# Patient Record
Sex: MALE | Race: ASIAN | NOT HISPANIC OR LATINO | Employment: FULL TIME | URBAN - METROPOLITAN AREA
[De-identification: names, ages, dates, MRNs, and addresses within clinical notes are randomized per-mention and may not be internally consistent; named-entity substitution may affect disease eponyms.]

---

## 2017-01-23 ENCOUNTER — TELEPHONE (OUTPATIENT)
Dept: FAMILY MEDICINE | Facility: CLINIC | Age: 32
End: 2017-01-23

## 2017-01-23 NOTE — TELEPHONE ENCOUNTER
[facility] Follow-up    Call initiated by clinic.  Message recorded by Fabiana Mendez  Calling for: monthly follow up  Patient: See HERBER French  Phone conversation with: Patient  Patient's Phone number/s: Home number on file [phone] (home)  Available today in the AM on their Home number.  OK to leave message on voice mail? Yes      Major diagnoses and/or issues requiring coordination services  Vertigo     In the past month, how many times have you been to the Emergency Room? 0  In the past month, how many times have you been hospitalized? 0    Summary notes: I called to check up on the pt, pt stated that he is doing the same. Pt has been taking his medication as directed.  Pt stated that his vertigo still the same, [facility].  Pt stated that he is just waiting, but for now he is doing fine. Pt did not have any other issue or concerns.      Self-management goals:  Get healthy    Counseling and coordination activities with: ROWAN Giles    Follow-up date: monthly

## 2017-01-25 ENCOUNTER — TRANSFERRED RECORDS (OUTPATIENT)
Dept: HEALTH INFORMATION MANAGEMENT | Facility: CLINIC | Age: 32
End: 2017-01-25

## 2017-02-07 ENCOUNTER — TELEPHONE (OUTPATIENT)
Dept: FAMILY MEDICINE | Facility: CLINIC | Age: 32
End: 2017-02-07

## 2017-02-07 NOTE — TELEPHONE ENCOUNTER
[facility] Follow-up    Call initiated by clinic.  Message recorded by Fabiana Mendez  Calling for: monthly follow up   Patient: See HERBER French  Phone conversation with: Patient  Patient's Phone number/s: Home number on file [phone] (home)  Available today in the PM on their Home number.  OK to leave message on voice mail? Yes      Major diagnoses and/or issues requiring coordination services  Dizzy     In the past month, how many times have you been to the Emergency Room? 0  In the past month, how many times have you been hospitalized? 0    Summary notes: I called to check up on the pt today, pt stated that he is doing the same.  Pt stated that he has been taking his medication as directed.  Pt stated that he still feels dizzy, he still goes to the [facility] center.  Pt is ok for now, he does not have any other issue or concerns right.     Self-management goals:  Walk more    Counseling and coordination activities with: ROWAN Giles    Follow-up date: monthly

## 2017-02-13 ENCOUNTER — TRANSFERRED RECORDS (OUTPATIENT)
Dept: HEALTH INFORMATION MANAGEMENT | Facility: CLINIC | Age: 32
End: 2017-02-13

## 2017-02-16 ENCOUNTER — OFFICE VISIT (OUTPATIENT)
Dept: FAMILY MEDICINE | Facility: CLINIC | Age: 32
End: 2017-02-16

## 2017-02-16 VITALS
TEMPERATURE: 97.8 F | DIASTOLIC BLOOD PRESSURE: 78 MMHG | HEIGHT: 64 IN | SYSTOLIC BLOOD PRESSURE: 112 MMHG | WEIGHT: 214 LBS | BODY MASS INDEX: 36.54 KG/M2 | RESPIRATION RATE: 16 BRPM | OXYGEN SATURATION: 97 % | HEART RATE: 69 BPM

## 2017-02-16 DIAGNOSIS — N62 GYNECOMASTIA, MALE: ICD-10-CM

## 2017-02-16 DIAGNOSIS — F33.3 SEVERE EPISODE OF RECURRENT MAJOR DEPRESSIVE DISORDER, WITH PSYCHOTIC FEATURES (H): Primary | ICD-10-CM

## 2017-02-16 DIAGNOSIS — H71.92 CHOLESTEATOMA, LEFT: ICD-10-CM

## 2017-02-16 DIAGNOSIS — R42 VERTIGO: ICD-10-CM

## 2017-02-16 NOTE — PROGRESS NOTES
Preceptor Attestation:  Patient's case reviewed and discussed with Shahbaz Garcia MD Patient seen and discussed with the resident.. I agree with assessment and plan of care.  Supervising Physician:  Lucy Malik DO  [facility]

## 2017-02-16 NOTE — PROGRESS NOTES
[facility]        Subjective   HPI: See HERBER French is a 31 year old  male with history of hep B, chronic dizziness s/p cholesteatoma surgery, major depression who presents as an established patient (with his son who is here for a C) for the following:    Ears: nightly foul smelling drainage started about 2-3 weeks after his last cleaning by ENT on 12/15/16. Very frustrated this is still happening.     Dizziness: Has been going to the [facility] center again. Last dizzy note advised PT due to deconditioning.    Hep B: went to [facility] in December. Compliant with tenofovir.    Mood: Now seeing a counselor every Friday at [facility], which he has found helpful.  Meds are managed by [facility] in [facility].  He has continued taking Abilify, sertraline and Minipress.   He denies any suicidal ideation currently.  Feels mood is about the same.    Financial: They have some school loans that are due and haven't been able to pay for - this is stressful and weighing on his mood. He is wondering what to do.    Dental: He is wondering if we could help set him up with a dentist - wasn't able to get this set up previously.    Gynecomastia: new complaint. For past 3 years. It's not much worse but certainly not getting any better.    ROS: constitutional, cardiovascular, respiratory, GI, , MSK systems reviewed and negative except as noted above.    PMH:   Current Outpatient Prescriptions   Medication     traZODone (DESYREL) 50 MG tablet     ARIPiprazole (ABILIFY) 10 MG tablet     tenofovir (VIREAD) 300 MG tablet     ibuprofen (ADVIL,MOTRIN) 600 MG tablet     prazosin (MINIPRESS) 1 MG capsule     sertraline (ZOLOFT) 100 MG tablet     meclizine (ANTIVERT) 25 MG tablet     order for DME     No current facility-administered medications for this visit.       ALLERGIES: Influenza vac split [flu virus vaccine]  Social: Denies tobacco, alcohol and illicit drug use.           Objective   /78 (BP Location: Right arm,  "Patient Position: Chair, Cuff Size: Adult Regular)  Pulse 69  Temp 97.8  F (36.6  C) (Oral)  Resp 16  Ht 5' 3.5\" (161.3 cm)  Wt 214 lb (97.1 kg)  SpO2 97%  BMI 37.31 kg/m2 Body mass index is 37.31 kg/(m^2).  Gen: healthy, alert and no distress,  HEENT: R ear normal. L ear with minimal yellow tinged drainage s/p cholesteatoma surgery.  Pulm: lungs clear to auscultation - no rales, rhonchi or wheezes  CV: regular rate and rhythm,  and no murmur, click,  rub or gallop  Abd: soft, nontender  Neuro: Normal strength and tone, sensory exam grossly normal, mentation appears intact and speech normal  Psych: flat affect, linear logical coherent thought process         Assessment & Plan     1. Severe episode of recurrent major depressive disorder, with psychotic features (H)  Continue abilify, sertraline and Minipress. Some auditory hallucinations, stable. No SI/HI. Compliant with therapy & [facility] f/u.    2. Cholesteatoma, left  Will have him return to see Dr Shields for repeat cleaning. Wonder if q3m cleanings are not frequent enough.    3. Vertigo  Continue at [facility].    4. Gynecomastia, male  Bilateral. Most likely secondary to atypical antipsychotic use. Will have him discuss this with his psychiatrist first.     F/u 2 months    Precepted today with: Dr King Garcia MD          "

## 2017-02-16 NOTE — MR AVS SNAPSHOT
After Visit Summary   2/16/2017    See HERBER French    MRN: [id number]           Patient Information     Date Of Birth          1985        Visit Information        Provider Department      2/16/2017 3:20 PM Shahbaz Garcia MD [facility]         Follow-ups after your visit        Your next 10 appointments already scheduled     Mar 16, 2017 11:00 AM CDT   Return Visit with Shahbaz Garcia MD   [facility] ([facility])    [address] [place] [id number]   [phone]              Who to contact     Please call your clinic at [phone] to:    Ask questions about your health    Make or cancel appointments    Discuss your medicines    Learn about your test results    Speak to your doctor   If you have compliments or concerns about an experience at your clinic, or if you wish to file a complaint, please contact [facility] Patient Relations at [phone] or email us at [email or web address]         Additional Information About Your Visit        MyChart Information     Zeetlt gives you secure access to your electronic health record. If you see a primary care provider, you can also send messages to your care team and make appointments. If you have questions, please call your primary care clinic.  If you do not have a primary care provider, please call [phone] and they will assist you.      Prezma is an electronic gateway that provides easy, online access to your medical records. With Prezma, you can request a clinic appointment, read your test results, renew a prescription or communicate with your care team.     To access your existing account, please contact your [facility] or call [phone] for assistance.        Care EveryWhere ID     This is your Care EveryWhere ID. This could be used by other organizations to access your [facility] medical records  [id number]        Your Vitals  "Were     Pulse Temperature Respirations Height Pulse Oximetry BMI (Body Mass Index)    69 97.8  F (36.6  C) (Oral) 16 5' 3.5\" (161.3 cm) 97% 37.31 kg/m2       Blood Pressure from Last 3 Encounters:   02/16/17 112/78   12/12/16 101/68   10/25/16 105/72    Weight from Last 3 Encounters:   02/16/17 214 lb (97.1 kg)   12/12/16 207 lb (93.9 kg)   10/25/16 210 lb 9.6 oz (95.5 kg)              Today, you had the following     No orders found for display       Primary Care Provider Office Phone # Fax #    Shahbaz Garcia [phone]       [facility] [address] [place] [address]        Thank you!     Thank you for choosing [facility]  for your care. Our goal is always to provide you with excellent care. Hearing back from our patients is one way we can continue to improve our services. Please take a few minutes to complete the written survey that you may receive in the mail after your visit with us. Thank you!             Your Updated Medication List - Protect others around you: Learn how to safely use, store and throw away your medicines at www.disposemymeds.org.          This list is accurate as of: 2/16/17  4:26 PM.  Always use your most recent med list.                   Brand Name Dispense Instructions for use    ARIPiprazole 10 MG tablet    ABILIFY     Take 1 tablet (10 mg) by mouth At Bedtime       ibuprofen 600 MG tablet    ADVIL/MOTRIN    120 tablet    Take 1 tablet (600 mg) by mouth every 6 hours as needed       meclizine 25 MG tablet    ANTIVERT    30 tablet    Take 1 tablet (25 mg) by mouth every 6 hours as needed for dizziness       order for DME     1 Device    Equipment being ordered: wheeled walker       prazosin 1 MG capsule    MINIPRESS    30 capsule    Take 2 capsules (2 mg) by mouth At Bedtime       sertraline 100 MG tablet    ZOLOFT    60 tablet    Take 2 tablets (200 mg) by mouth daily       traZODone 50 MG tablet    DESYREL    30 tablet    Take 1 " tablet (50 mg) by mouth At Bedtime       VIREAD 300 MG tablet   Generic drug:  tenofovir      Take 1 tablet (300 mg) by mouth daily

## 2017-03-01 ENCOUNTER — TELEPHONE (OUTPATIENT)
Dept: FAMILY MEDICINE | Facility: CLINIC | Age: 32
End: 2017-03-01

## 2017-03-01 NOTE — TELEPHONE ENCOUNTER
[facility] Follow-up    Call initiated by clinic.  Message recorded by Fabiana Mendez  Calling for: monthly follow up   Patient: See HERBER French  Phone conversation with: Patient  Patient's Phone number/s: Home number on file [phone] (home)  Available today in the PM on their Home number.  OK to leave message on voice mail? Yes      Major diagnoses and/or issues requiring coordination services  Vertigo      In the past month, how many times have you been to the Emergency Room? 0  In the past month, how many times have you been hospitalized? 0    Summary notes: I called to check up on the pt, pt did not answer his phone.  I left a vm for the pt to give me a call back.     Self-management goals:      Counseling and coordination activities with: ROWAN Giles  Follow-up date: monthly

## 2017-03-02 ENCOUNTER — TELEPHONE (OUTPATIENT)
Dept: FAMILY MEDICINE | Facility: CLINIC | Age: 32
End: 2017-03-02

## 2017-03-02 NOTE — TELEPHONE ENCOUNTER
I got a message from  about the pt, he wanted me to call the pt.   wanted me to call the pt, and have him discuss his breast enlargement with his psychiatrist.   stated that it could be the medication that he is taking.   also wanted me to help the pt with setting up a ENT appointment.  It seems like the has been having more drainage from his ear lately.  I called the pt to inform him about the breast enlargement, but the pt did not answer his phone.  I have left the pt a vm to talk to his psychiatrist about it, and I will help him set up the ENT appointment.      I called [facility] to help set up an appointment with .  I was able to set up the pt for 03/09/17 at 9:30am.      What: ENT  Where: [facility]    When: 03/09/17 at 9:30am   Who is with:   Telephone: [phone]  Fax:   Any additional comments:   Scheduled by: ROWAN Giles

## 2017-03-09 ENCOUNTER — TRANSFERRED RECORDS (OUTPATIENT)
Dept: HEALTH INFORMATION MANAGEMENT | Facility: CLINIC | Age: 32
End: 2017-03-09

## 2017-03-16 ENCOUNTER — OFFICE VISIT (OUTPATIENT)
Dept: FAMILY MEDICINE | Facility: CLINIC | Age: 32
End: 2017-03-16

## 2017-03-16 VITALS
SYSTOLIC BLOOD PRESSURE: 116 MMHG | RESPIRATION RATE: 20 BRPM | DIASTOLIC BLOOD PRESSURE: 79 MMHG | HEART RATE: 85 BPM | BODY MASS INDEX: 37.87 KG/M2 | WEIGHT: 217.2 LBS | TEMPERATURE: 97.8 F | OXYGEN SATURATION: 96 %

## 2017-03-16 DIAGNOSIS — R42 VERTIGO: ICD-10-CM

## 2017-03-16 DIAGNOSIS — N62 GYNECOMASTIA, MALE: ICD-10-CM

## 2017-03-16 DIAGNOSIS — L03.011 PARONYCHIA OF FINGER, RIGHT: ICD-10-CM

## 2017-03-16 DIAGNOSIS — B18.1 CHRONIC HEPATITIS B VIRUS INFECTION (H): Primary | Chronic | ICD-10-CM

## 2017-03-16 NOTE — MR AVS SNAPSHOT
After Visit Summary   3/16/2017    See HERBER French    MRN: [id number]           Patient Information     Date Of Birth          1985        Visit Information        Provider Department      3/16/2017 11:00 AM Shahbaz Garcia MD [facility]        Today's Diagnoses     Chronic hepatitis B virus infection (H)    -  1    Vertigo        Gynecomastia, male        Paronychia of finger, right           Follow-ups after your visit        Your next 10 appointments already scheduled     Apr 24, 2017  2:20 PM CDT   Return Visit with Shahbaz Garcia MD   [facility] ([facility])    [address] [place] [address]   [phone]              Who to contact     Please call your clinic at [phone] to:    Ask questions about your health    Make or cancel appointments    Discuss your medicines    Learn about your test results    Speak to your doctor   If you have compliments or concerns about an experience at your clinic, or if you wish to file a complaint, please contact [facility] Patient Relations at [phone] or email us at [email or web address]         Additional Information About Your Visit        MyChart Information     Jump On Itt gives you secure access to your electronic health record. If you see a primary care provider, you can also send messages to your care team and make appointments. If you have questions, please call your primary care clinic.  If you do not have a primary care provider, please call [phone] and they will assist you.      J[employer] On Itt is an electronic gateway that provides easy, online access to your medical records. With [employer], you can request a clinic appointment, read your test results, renew a prescription or communicate with your care team.     To access your existing account, please contact your [facility] or call [phone] for assistance.        Care EveryWhere ID      This is your Care EveryWhere ID. This could be used by other organizations to access your [facility] medical records  [id number]        Your Vitals Were     Pulse Temperature Respirations Pulse Oximetry BMI (Body Mass Index)       85 97.8  F (36.6  C) 20 96% 37.87 kg/m2        Blood Pressure from Last 3 Encounters:   03/16/17 116/79   02/16/17 112/78   12/12/16 101/68    Weight from Last 3 Encounters:   03/16/17 217 lb 3.2 oz (98.5 kg)   02/16/17 214 lb (97.1 kg)   12/12/16 207 lb (93.9 kg)              Today, you had the following     No orders found for display       Primary Care Provider Office Phone # Fax #    Shahbaz Garcia [phone]       [facility] [address] [place] [address]        Thank you!     Thank you for choosing [facility]  for your care. Our goal is always to provide you with excellent care. Hearing back from our patients is one way we can continue to improve our services. Please take a few minutes to complete the written survey that you may receive in the mail after your visit with us. Thank you!             Your Updated Medication List - Protect others around you: Learn how to safely use, store and throw away your medicines at www.disposemymeds.org.          This list is accurate as of: 3/16/17 11:59 PM.  Always use your most recent med list.                   Brand Name Dispense Instructions for use    ARIPiprazole 10 MG tablet    ABILIFY     Take 1 tablet (10 mg) by mouth At Bedtime       ibuprofen 600 MG tablet    ADVIL/MOTRIN    120 tablet    Take 1 tablet (600 mg) by mouth every 6 hours as needed       meclizine 25 MG tablet    ANTIVERT    30 tablet    Take 1 tablet (25 mg) by mouth every 6 hours as needed for dizziness       order for DME     1 Device    Equipment being ordered: wheeled walker       prazosin 1 MG capsule    MINIPRESS    30 capsule    Take 2 capsules (2 mg) by mouth At Bedtime       sertraline 100 MG tablet     ZOLOFT    60 tablet    Take 2 tablets (200 mg) by mouth daily       traZODone 50 MG tablet    DESYREL    30 tablet    Take 1 tablet (50 mg) by mouth At Bedtime       VIREAD 300 MG tablet   Generic drug:  tenofovir      Take 1 tablet (300 mg) by mouth daily

## 2017-03-16 NOTE — PROGRESS NOTES
[facility]        Subjective   HPI: See HERBER French is a 31 year old  male with history of hep B, chronic dizziness s/p cholesteatoma surgery, major depression who presents as an established patient for the following:    Right ring finger - has a small area of redness on it and it quite a bit.    Ears: went back to Dr Shields, cleaned out left ear. It feels very scratchy.  Dr King put in some powder but the ear is still draining. Advised to return in three months.    Dizziness: Needs souk to reschedule at dizzy center and with PT as recommended by the dizzy center.  He is still intermittently lightheaded & dizzy, having problems walking straight.     Hep B: went to [facility] in December but we never received a copy of the records. He says they checked his blood but isn't sure what the results were..     Mood: Now seeing a counselor every Friday at [facility], which he has found helpful.  Meds are managed by [facility] in [facility].  He has continued taking Abilify, sertraline and Minipress.   He denies any suicidal ideation currently.  Feels mood is about the same. Says he was recently established with a occupational counselor of some sort to see if he would be eligible to work on some capacity again.     Dental: went to see dentist, wanted to do a cleaning that would cost $500 to fix several cavities, insurance wouldn't cover it.     Gynecomastia: he reports he discussed this with his psychiatrist and they might check blood work. His breasts are slightly tender bilaterally. There is not any discharge. His testicles are normal in size (per See) and he is able to have erections without difficulty.     ROS: constitutional, cardiovascular, respiratory, GI, , MSK systems reviewed and negative except as noted above.    PMH:   Current Outpatient Prescriptions   Medication     traZODone (DESYREL) 50 MG tablet     ARIPiprazole (ABILIFY) 10 MG tablet     tenofovir (VIREAD) 300 MG tablet      ibuprofen (ADVIL,MOTRIN) 600 MG tablet     prazosin (MINIPRESS) 1 MG capsule     sertraline (ZOLOFT) 100 MG tablet     meclizine (ANTIVERT) 25 MG tablet     order for DME     No current facility-administered medications for this visit.       ALLERGIES: Influenza vac split [flu virus vaccine]  Social: Denies tobacco, alcohol and illicit drug use.  Still having some financial stress from old student loans.           Objective   /79  Pulse 85  Temp 97.8  F (36.6  C)  Resp 20  Wt 217 lb 3.2 oz (98.5 kg)  SpO2 96%  BMI 37.87 kg/m2 Body mass index is 37.87 kg/(m^2).  Gen: healthy, alert and no distress,  HEENT: R ear normal. L ear with minimal yellow tinged drainage s/p cholesteatoma surgery.  Pulm: lungs clear to auscultation - no rales, rhonchi or wheezes  CV: regular rate and rhythm,  and no murmur, click,  rub or gallop  Abd: soft, nontender  Extrem: right ring finger with a trace amount ( <2mm x 2mm) of erythema on the ulnar aspect of the nail.   Psych: flat affect, linear logical coherent thought process         Assessment & Plan   1. Chronic hepatitis B virus infection (H)  Will request records from [facility]    2. Vertigo  Asked souk to set him up for PT & dizzy center appointments.    3. Gynecomastia, male  Sounds like his psychiatrist will check blood work and then decide if to he should stay on his current regimen or not. At this time do not think there is any red flags to suggest this is anything except for medication adverse effect from his atypical antipsychotic.    4. Paronychia of finger, right  Very mild. Advised warm compresses / soaks and to return if not improving.     F/u 2 months    Precepted today with: Dr King Garcia MD

## 2017-03-17 ASSESSMENT — PATIENT HEALTH QUESTIONNAIRE - PHQ9: SUM OF ALL RESPONSES TO PHQ QUESTIONS 1-9: 12

## 2017-03-21 ENCOUNTER — TRANSFERRED RECORDS (OUTPATIENT)
Dept: HEALTH INFORMATION MANAGEMENT | Facility: CLINIC | Age: 32
End: 2017-03-21

## 2017-04-04 ENCOUNTER — TELEPHONE (OUTPATIENT)
Dept: FAMILY MEDICINE | Facility: CLINIC | Age: 32
End: 2017-04-04

## 2017-04-04 NOTE — TELEPHONE ENCOUNTER
[facility] Follow-up    Call initiated by clinic.  Message recorded by Fabiana Mendez  Calling for: monthly follow up   Patient: See HERBER French  Phone conversation with: Patient  Patient's Phone number/s: Home number on file [phone] (home)  Available today in the AM on their Home number.  OK to leave message on voice mail? Yes      Major diagnoses and/or issues requiring coordination services  Vertigo      In the past month, how many times have you been to the Emergency Room? 0  In the past month, how many times have you been hospitalized? 0    Summary notes: I called to check up on the pt, pt stated that he is doing the same.  Pt stated that he has been taking his medication as directed.  Pt stated that he is still [facility] with [facility].  Pt stated that it has been ok lately.  Pt stated that he seems to be doing fine for now.  Pt does not have any other issue or concerns right now.      Self-management goals:  Get out more    Counseling and coordination activities with: ROWAN Giles    Follow-up date: monthly

## 2017-04-24 ENCOUNTER — OFFICE VISIT (OUTPATIENT)
Dept: FAMILY MEDICINE | Facility: CLINIC | Age: 32
End: 2017-04-24

## 2017-04-24 VITALS
DIASTOLIC BLOOD PRESSURE: 71 MMHG | HEART RATE: 68 BPM | RESPIRATION RATE: 20 BRPM | SYSTOLIC BLOOD PRESSURE: 107 MMHG | WEIGHT: 220.8 LBS | HEIGHT: 64 IN | OXYGEN SATURATION: 96 % | BODY MASS INDEX: 37.69 KG/M2 | TEMPERATURE: 97.6 F

## 2017-04-24 DIAGNOSIS — H71.92 CHOLESTEATOMA, LEFT: Primary | ICD-10-CM

## 2017-04-24 DIAGNOSIS — B18.1 CHRONIC HEPATITIS B VIRUS INFECTION (H): Chronic | ICD-10-CM

## 2017-04-24 DIAGNOSIS — F33.3 SEVERE EPISODE OF RECURRENT MAJOR DEPRESSIVE DISORDER, WITH PSYCHOTIC FEATURES (H): ICD-10-CM

## 2017-04-24 NOTE — PROGRESS NOTES
"[facility]        Subjective   HPI: See HERBER French is a 31 year old male with history of hep B, depression who presents as an established patient for the followin.  Left shoulder nodule: Patient reports a bump on his left upper arm that has been present for about the past 2 years.  He says it has been slowly growing in size and is quite painful to touch, especially when his children give him a hug or is playing with them. He denies any fevers or chills.  Denies any history of foreign bodies in this area.     2.  Patient fell out of chair a few months ago at his house. He has a picture of the broken chair with the rear legs collapsed and he fell backwards.   He reports a small amount of back pain on a few days a week.  He has been using ibuprofen and massage to help with this.    3.  With regards to hepatitis B, we recently received a letter from [facility] saying that they were concerned he was not taking his telaprevir every single day.  Patient and his wife both confirm that he has actually been taking this.  On call the pharmacy, he has refilled his prescription every single month since September at around the same time each month.    4.  He continues to have chronic ear drainage from his left ear.  He reports that his wife thinks it is foul-smelling.  He recently went to Dr. Shields who again cleaned that out.    5. With regard to his gynecomastia, the patient reports that he did talk about it with his psychiatrist but he cannot remember what the conclusion was.        ROS: constitutional, cardiovascular, respiratory, GI, , MSK systems reviewed and negative except as noted above.    Social:  Lives with wife and children. Unemployed.          Objective   /71 (BP Location: Right arm, Patient Position: Chair, Cuff Size: Adult Regular)  Pulse 68  Temp 97.6  F (36.4  C) (Oral)  Resp 20  Ht 5' 3.5\" (161.3 cm)  Wt 220 lb 12.8 oz (100.2 kg)  SpO2 96%  BMI 38.5 kg/m2 " Body mass index is 38.5 kg/(m^2).  Gen: healthy, alert and no distress,  HEENT: no adenopathy, no asymmetry, masses, or scars  Pulm: lungs clear to auscultation - no rales, rhonchi or wheezes  CV: regular rate and rhythm,  and no murmur, click,  rub or gallop  Chest: significant gynecomastia bilaterally  Abd: soft, nontender, obese  Back: nontender to palpation  Neuro: Normal strength and tone, sensory exam grossly normal, mentation appears intact and speech normal  Psych: mood and affect flat    Bedside ultrasound of the left upper arm reveals a 6 mm fluid-filled structure with clearly delineated borders just beneath the skin surface.         Assessment & Plan     1.  Arm nodule: Suspect this may be an inclusion cyst although it is quite firm on exam.  He will return for a procedure only visit to have this removed as it continues to be quite painful and has not improved with simply waiting.    2. hepatitis B:  he appears to be very compliant with his telaprevir.  I will call [facility] and inform him of the pharmacy records confirming his compliance;   They may need to adjust his medications.    3.  Cholesteatoma: Patient has been compliant with frequent cleanings with Dr. Shields but has not improved.  It seems reasonable at this point to request a second opinion to see if his management of this can be optimized in any way.  We will see if we get him set up with [facility].    4.  Gynecomastia I will call his psychiatrist to see what her plan was with this as it certainly seems to have gotten worse from the time I first met Mr. French.    5.  Back pain after falling out of the chair: Suspect localized bruising. We did not have time to address this further today.  Will review at next visit if it is not improved at that point.    Precepted today with: MD Shahbaz Wooten MD    -------  PMH:  Patient Active Problem List   Diagnosis     Chronic hepatitis B virus infection (H)     LTBI (latent tuberculosis  infection)     Severe episode of recurrent major depressive disorder, with psychotic features (H)     Atypical chest pain     [facility]     H/O suicide attempt     Nightmare     Back pain     Vertigo     Severe vertigo      Current Outpatient Prescriptions   Medication     traZODone (DESYREL) 50 MG tablet     ARIPiprazole (ABILIFY) 10 MG tablet     tenofovir (VIREAD) 300 MG tablet     ibuprofen (ADVIL,MOTRIN) 600 MG tablet     prazosin (MINIPRESS) 1 MG capsule     sertraline (ZOLOFT) 100 MG tablet     meclizine (ANTIVERT) 25 MG tablet     order for DME     No current facility-administered medications for this visit.       ALLERGIES: Influenza vac split [flu virus vaccine]    Preceptor Attestation:  I saw and evaluated the patient.  I reviewed the resident physician's history, exam, and treatment plan; and I agree with the documentation by the resident physician.  Supervising Physician:  Ricardo Hardy MD

## 2017-04-24 NOTE — MR AVS SNAPSHOT
After Visit Summary   4/24/2017    See HERBER French    MRN: [id number]           Patient Information     Date Of Birth          1985        Visit Information        Provider Department      4/24/2017 2:20 PM Shahbaz Garcia MD [facility]        Today's Diagnoses     Cholesteatoma, left    -  1    Severe episode of recurrent major depressive disorder, with psychotic features (H)        Chronic hepatitis B virus infection (H)          Care Instructions    Referral for ( TEST )  :      Otolaryngology  LOCATION/PLACE/Provider :    [facility] [address] [place] [address]  DATE & TIME :     4-25-17 at 9:30am  PHONE :     [phone]  FAX :     [phone]  ADDITIONAL INFORMATION :     NA  Appointment made by clinic staff/:    STEFANO      4/26/17 [facility] consult notes to Dr. Garcia. JOSÉ MIGUEL Gar (c)        Follow-ups after your visit        Additional Services     OTOLARYNGOLOGY REFERRAL       Patient to stop at the [facility] Desk    Reason for Referral: [facility]. cholesteatoma, 2nd opinion. Chronic foul smelling drainage not improving despite cleanings q3 months.     needed: No  Language: English    May leave message on voicemail: No    ([facility]) Referral should be tracked (Yes/No)?                  Your next 10 appointments already scheduled     May 15, 2017  3:00 PM CDT   Procedure with Shahbaz Garcia MD, [facility] PROCEDURE ROOM   [facility] ([facility])    [address] [place] [id number] [phone]            May 30, 2017  9:40 AM CDT   Return Visit with Shahbaz Garcia MD   [facility] ([facility])    [address] [place] [address]   [phone]              Who to contact     Please call your clinic at [phone] to:    Ask questions about your health    Make or cancel appointments    Discuss your medicines    Learn about your test results    Speak to your doctor   If you have compliments or  "concerns about an experience at your clinic, or if you wish to file a complaint, please contact [facility] Patient Relations at [phone] or email us at [email or web address]         Additional Information About Your Visit        MyChart Information     [employer] gives you secure access to your electronic health record. If you see a primary care provider, you can also send messages to your care team and make appointments. If you have questions, please call your primary care clinic.  If you do not have a primary care provider, please call [phone] and they will assist you.      [employer] is an electronic gateway that provides easy, online access to your medical records. With [employer], you can request a clinic appointment, read your test results, renew a prescription or communicate with your care team.     To access your existing account, please contact your [facility] or call [phone] for assistance.        Care EveryWhere ID     This is your Care EveryWhere ID. This could be used by other organizations to access your [facility] medical records  [id number]        Your Vitals Were     Pulse Temperature Respirations Height Pulse Oximetry BMI (Body Mass Index)    68 97.6  F (36.4  C) (Oral) 20 5' 3.5\" (161.3 cm) 96% 38.5 kg/m2       Blood Pressure from Last 3 Encounters:   04/24/17 107/71   03/16/17 116/79   02/16/17 112/78    Weight from Last 3 Encounters:   04/24/17 220 lb 12.8 oz (100.2 kg)   03/16/17 217 lb 3.2 oz (98.5 kg)   02/16/17 214 lb (97.1 kg)               Primary Care Provider Office Phone # Fax #    Shahbaz Garcia [phone]       [facility] [address] [place] [address]        Thank you!     Thank you for choosing [facility]  for your care. Our goal is always to provide you with excellent care. Hearing back from our patients is one way we can continue to improve our services. " Please take a few minutes to complete the written survey that you may receive in the mail after your visit with us. Thank you!             Your Updated Medication List - Protect others around you: Learn how to safely use, store and throw away your medicines at www.disposemymeds.org.          This list is accurate as of: 4/24/17 11:59 PM.  Always use your most recent med list.                   Brand Name Dispense Instructions for use    ARIPiprazole 10 MG tablet    ABILIFY     Take 1 tablet (10 mg) by mouth At Bedtime       ibuprofen 600 MG tablet    ADVIL/MOTRIN    120 tablet    Take 1 tablet (600 mg) by mouth every 6 hours as needed       meclizine 25 MG tablet    ANTIVERT    30 tablet    Take 1 tablet (25 mg) by mouth every 6 hours as needed for dizziness       order for DME     1 Device    Equipment being ordered: wheeled walker       prazosin 1 MG capsule    MINIPRESS    30 capsule    Take 2 capsules (2 mg) by mouth At Bedtime       sertraline 100 MG tablet    ZOLOFT    60 tablet    Take 2 tablets (200 mg) by mouth daily       traZODone 50 MG tablet    DESYREL    30 tablet    Take 1 tablet (50 mg) by mouth At Bedtime       VIREAD 300 MG tablet   Generic drug:  tenofovir      Take 1 tablet (300 mg) by mouth daily

## 2017-05-04 ENCOUNTER — TELEPHONE (OUTPATIENT)
Dept: FAMILY MEDICINE | Facility: CLINIC | Age: 32
End: 2017-05-04

## 2017-05-04 NOTE — TELEPHONE ENCOUNTER
[facility] Follow-up    Call initiated by clinic.  Message recorded by Fabiana Mendez  Calling for: monthly follow up   Patient: See HERBER French  Phone conversation with: Patient  Patient's Phone number/s: Home number on file [phone] (home)  Available today in the PM on their Home number.  OK to leave message on voice mail? Yes      Major diagnoses and/or issues requiring coordination services  Vertigo      In the past month, how many times have you been to the Emergency Room? 0  In the past month, how many times have you been hospitalized? 0    Summary notes: I called to check up on the pt, pt stated that he is doing the same.  Pt stated that he has been taking his medication as directed.  Pt stated that he still has vertigo pretty frequently like before.  Pt stated that he [facility]  Pt is doing ok, he does not have any other issue or concerns right now.      Self-management goals:  Feel better  Counseling and coordination activities with: ROWAN Giles    Follow-up date: monthly

## 2017-05-15 ENCOUNTER — OFFICE VISIT (OUTPATIENT)
Dept: FAMILY MEDICINE | Facility: CLINIC | Age: 32
End: 2017-05-15

## 2017-05-15 VITALS
HEIGHT: 64 IN | TEMPERATURE: 97.7 F | HEART RATE: 71 BPM | OXYGEN SATURATION: 97 % | WEIGHT: 220.2 LBS | BODY MASS INDEX: 37.59 KG/M2 | SYSTOLIC BLOOD PRESSURE: 114 MMHG | DIASTOLIC BLOOD PRESSURE: 79 MMHG

## 2017-05-15 DIAGNOSIS — L98.9 SKIN LESION: Primary | ICD-10-CM

## 2017-05-15 ASSESSMENT — ANXIETY QUESTIONNAIRES
5. BEING SO RESTLESS THAT IT IS HARD TO SIT STILL: NOT AT ALL
1. FEELING NERVOUS, ANXIOUS, OR ON EDGE: SEVERAL DAYS
GAD7 TOTAL SCORE: 2
3. WORRYING TOO MUCH ABOUT DIFFERENT THINGS: SEVERAL DAYS
2. NOT BEING ABLE TO STOP OR CONTROL WORRYING: NOT AT ALL
IF YOU CHECKED OFF ANY PROBLEMS ON THIS QUESTIONNAIRE, HOW DIFFICULT HAVE THESE PROBLEMS MADE IT FOR YOU TO DO YOUR WORK, TAKE CARE OF THINGS AT HOME, OR GET ALONG WITH OTHER PEOPLE: SOMEWHAT DIFFICULT
6. BECOMING EASILY ANNOYED OR IRRITABLE: NOT AT ALL
7. FEELING AFRAID AS IF SOMETHING AWFUL MIGHT HAPPEN: NOT AT ALL

## 2017-05-15 ASSESSMENT — PATIENT HEALTH QUESTIONNAIRE - PHQ9: 5. POOR APPETITE OR OVEREATING: NOT AT ALL

## 2017-05-15 NOTE — MR AVS SNAPSHOT
After Visit Summary   5/15/2017    See HERBER French    MRN: [id number]           Patient Information     Date Of Birth          1985        Visit Information        Provider Department      5/15/2017 3:00 PM Shahbaz Garcia MD; [facility] PROCEDURE ROOM [facility]        Today's Diagnoses     Skin lesion    -  1       Follow-ups after your visit        Your next 10 appointments already scheduled     May 30, 2017  9:40 AM CDT   Return Visit with Shahbaz Garcia MD   [facility] ([facility])    [address] [place] [address]   [phone]              Who to contact     Please call your clinic at [phone] to:    Ask questions about your health    Make or cancel appointments    Discuss your medicines    Learn about your test results    Speak to your doctor   If you have compliments or concerns about an experience at your clinic, or if you wish to file a complaint, please contact [facility] Patient Relations at [phone] or email us at [email or web address]         Additional Information About Your Visit        MyChart Information     [employer] gives you secure access to your electronic health record. If you see a primary care provider, you can also send messages to your care team and make appointments. If you have questions, please call your primary care clinic.  If you do not have a primary care provider, please call [phone] and they will assist you.      [employer] is an electronic gateway that provides easy, online access to your medical records. With [employer], you can request a clinic appointment, read your test results, renew a prescription or communicate with your care team.     To access your existing account, please contact your [facility] or call [phone] for assistance.        Care EveryWhere ID     This is your Care EveryWhere ID. This could be used by other organizations to  "access your [place] medical records  [id number]        Your Vitals Were     Pulse Temperature Height Pulse Oximetry BMI (Body Mass Index)       71 97.7  F (36.5  C) (Oral) 5' 3.75\" (161.9 cm) 97% 38.09 kg/m2        Blood Pressure from Last 3 Encounters:   05/15/17 114/79   04/24/17 107/71   03/16/17 116/79    Weight from Last 3 Encounters:   05/15/17 220 lb 3.2 oz (99.9 kg)   04/24/17 220 lb 12.8 oz (100.2 kg)   03/16/17 217 lb 3.2 oz (98.5 kg)              We Performed the Following     EXC BENIGN SKIN LESION TRUNK/ARM/LEG 0.6-1.0 CM     Pathology  ([facility])        Primary Care Provider Office Phone # Fax #    Shahbaz Garcia [phone]       [facility] [address] [place] [address]        Thank you!     Thank you for choosing [facility]  for your care. Our goal is always to provide you with excellent care. Hearing back from our patients is one way we can continue to improve our services. Please take a few minutes to complete the written survey that you may receive in the mail after your visit with us. Thank you!             Your Updated Medication List - Protect others around you: Learn how to safely use, store and throw away your medicines at www.disposemymeds.org.          This list is accurate as of: 5/15/17 11:59 PM.  Always use your most recent med list.                   Brand Name Dispense Instructions for use    ARIPiprazole 10 MG tablet    ABILIFY     Take 1 tablet (10 mg) by mouth At Bedtime       ibuprofen 600 MG tablet    ADVIL/MOTRIN    120 tablet    Take 1 tablet (600 mg) by mouth every 6 hours as needed       meclizine 25 MG tablet    ANTIVERT    30 tablet    Take 1 tablet (25 mg) by mouth every 6 hours as needed for dizziness       order for DME     1 Device    Equipment being ordered: wheeled walker       prazosin 1 MG capsule    MINIPRESS    30 capsule    Take 2 capsules (2 mg) by mouth At Bedtime       sertraline 100 MG tablet    " ZOLOFT    60 tablet    Take 2 tablets (200 mg) by mouth daily       traZODone 50 MG tablet    DESYREL    30 tablet    Take 1 tablet (50 mg) by mouth At Bedtime       VIREAD 300 MG tablet   Generic drug:  tenofovir      Take 1 tablet (300 mg) by mouth daily

## 2017-05-15 NOTE — PROCEDURES
Skin Procedure Note    See HERBER French is a patient of Shahbaz Cochran here for excisional biopsy of an suspected inclusion cyst. Patient has had ongoing significant pain and irritation from this.    Consent: Affirmation of informed consent was signed and scanned into the medical record. Risks, benefits and alternatives were discussed. Patient's questions were elicited and answered.   Procedure safety checklist was completed:  Yes  Time Out (Pause for the Cause) completed: Yes    Preoperative Diagnosis: inclusion cyst   Location: LEFT arm   Size:  6 mm  Postoperative Diagnosis: same    Technique:   Skin prep Betadine  Anesthesia 2 cc 2% lidocaine, with epi   Procedure A 2 cm by 1 cm elliptical incision was made around the lesion with a 15 blade. Sharp dissection with scissors around the lesion, which was palpably firm, was performed down into the subcutaneous fatty tissue. The lesion was cleared at the base. Closed with 5 interupted deep stitches of 3-0 vicryl and then 4 superficial stitches of 3-0 nylon and reinforced with steristrips.  Hemostasis  None needed    EBL:    5 cc  Complications:  No  Tolerance:   Pt tolerated procedure well and was in stable condition.   Pathology sent Yes    Instructions:    Pt was instructed to call if bleeding, severe pain or foul smell.   Follow up in 1 week as scheduled previously      Resident: Shahbaz Garcia  Faculty: Betito Swanson MD present for and supervised this entire procedure.

## 2017-05-15 NOTE — PROGRESS NOTES
Preceptor Attestation:  Patient's case reviewed and discussed with Shahbaz Garcia MD Patient seen and discussed with the resident. and I was present for and supervised the entire procedure.. I agree with assessment and plan of care.  Supervising Physician:  Betito Swanson MD  [facility]

## 2017-05-17 ENCOUNTER — RECORDS - HEALTHEAST (OUTPATIENT)
Dept: ADMINISTRATIVE | Facility: OTHER | Age: 32
End: 2017-05-17

## 2017-05-17 LAB
LAB AP CASE REPORT: NORMAL
LAB AP CHARGES: NORMAL
LAB AP CLINICAL INFORMATION: NORMAL
LAB AP FINAL DIAGNOSIS: NORMAL
LAB AP GROSS DESCRIPTION: NORMAL
LAB AP MALIGNANT?: NORMAL
LAB AP MICROSCOPIC DESCRIPTION: NORMAL

## 2017-05-17 ASSESSMENT — ANXIETY QUESTIONNAIRES: GAD7 TOTAL SCORE: 2

## 2017-05-17 ASSESSMENT — PATIENT HEALTH QUESTIONNAIRE - PHQ9: SUM OF ALL RESPONSES TO PHQ QUESTIONS 1-9: 9

## 2017-05-22 ENCOUNTER — OFFICE VISIT (OUTPATIENT)
Dept: FAMILY MEDICINE | Facility: CLINIC | Age: 32
End: 2017-05-22

## 2017-05-22 VITALS
TEMPERATURE: 97.9 F | BODY MASS INDEX: 37.39 KG/M2 | OXYGEN SATURATION: 98 % | HEIGHT: 64 IN | WEIGHT: 219 LBS | RESPIRATION RATE: 16 BRPM | SYSTOLIC BLOOD PRESSURE: 103 MMHG | DIASTOLIC BLOOD PRESSURE: 71 MMHG | HEART RATE: 72 BPM

## 2017-05-22 DIAGNOSIS — Z98.890 S/P BIOPSY: Primary | ICD-10-CM

## 2017-05-22 NOTE — MR AVS SNAPSHOT
"              After Visit Summary   5/22/2017    See HERBER French    MRN: [id number]           Patient Information     Date Of Birth          1985        Visit Information        Provider Department      5/22/2017 4:00 PM Shahbaz Garcia MD [facility]        Today's Diagnoses     S/P biopsy    -  1       Follow-ups after your visit        Who to contact     Please call your clinic at [phone] to:    Ask questions about your health    Make or cancel appointments    Discuss your medicines    Learn about your test results    Speak to your doctor   If you have compliments or concerns about an experience at your clinic, or if you wish to file a complaint, please contact [facility] Patient Relations at [phone] or email us at [email or web address]         Additional Information About Your Visit        MyChart Information     [employer] gives you secure access to your electronic health record. If you see a primary care provider, you can also send messages to your care team and make appointments. If you have questions, please call your primary care clinic.  If you do not have a primary care provider, please call [phone] and they will assist you.      [employer] is an electronic gateway that provides easy, online access to your medical records. With [employer], you can request a clinic appointment, read your test results, renew a prescription or communicate with your care team.     To access your existing account, please contact your [facility] or call [phone] for assistance.        Care EveryWhere ID     This is your Care EveryWhere ID. This could be used by other organizations to access your [place] medical records  [id number]        Your Vitals Were     Pulse Temperature Respirations Height Pulse Oximetry BMI (Body Mass Index)    72 97.9  F (36.6  C) (Oral) 16 5' 3.5\" (161.3 cm) 98% 38.19 kg/m2       Blood Pressure from Last 3 " Encounters:   05/22/17 103/71   05/15/17 114/79   04/24/17 107/71    Weight from Last 3 Encounters:   05/22/17 219 lb (99.3 kg)   05/15/17 220 lb 3.2 oz (99.9 kg)   04/24/17 220 lb 12.8 oz (100.2 kg)              Today, you had the following     No orders found for display       Primary Care Provider Office Phone # Fax #    Shahbaz Garcia [phone]       [facility] [address] [place] [address]        Thank you!     Thank you for choosing [facility]  for your care. Our goal is always to provide you with excellent care. Hearing back from our patients is one way we can continue to improve our services. Please take a few minutes to complete the written survey that you may receive in the mail after your visit with us. Thank you!             Your Updated Medication List - Protect others around you: Learn how to safely use, store and throw away your medicines at www.disposemymeds.org.          This list is accurate as of: 5/22/17 11:59 PM.  Always use your most recent med list.                   Brand Name Dispense Instructions for use    ARIPiprazole 10 MG tablet    ABILIFY     Take 1 tablet (10 mg) by mouth At Bedtime       ibuprofen 600 MG tablet    ADVIL/MOTRIN    120 tablet    Take 1 tablet (600 mg) by mouth every 6 hours as needed       meclizine 25 MG tablet    ANTIVERT    30 tablet    Take 1 tablet (25 mg) by mouth every 6 hours as needed for dizziness       order for DME     1 Device    Equipment being ordered: wheeled walker       prazosin 1 MG capsule    MINIPRESS    30 capsule    Take 2 capsules (2 mg) by mouth At Bedtime       sertraline 100 MG tablet    ZOLOFT    60 tablet    Take 2 tablets (200 mg) by mouth daily       traZODone 50 MG tablet    DESYREL    30 tablet    Take 1 tablet (50 mg) by mouth At Bedtime       VIREAD 300 MG tablet   Generic drug:  tenofovir      Take 1 tablet (300 mg) by mouth daily

## 2017-06-01 ENCOUNTER — TELEPHONE (OUTPATIENT)
Dept: FAMILY MEDICINE | Facility: CLINIC | Age: 32
End: 2017-06-01

## 2017-06-01 NOTE — TELEPHONE ENCOUNTER
[facility] Follow-up    Call initiated by clinic.  Message recorded by Fabiana Mendez  Calling for: monthly follow up  Patient: See HERBER French  Phone conversation with: Patient  Patient's Phone number/s: Home number on file [phone] (home)  Available today in the AM on their Home number.  OK to leave message on voice mail? Yes      Major diagnoses and/or issues requiring coordination services  Vertigo      In the past month, how many times have you been to the Emergency Room? 0  In the past month, how many times have you been hospitalized? 0    Summary notes: I called to check up on the pt, pt stated that he is doing the same.  Pt stated that he is taking his medication as directed.  Pt said that he has a up coming [facility].  Pt is doing ok for now, pt does not have any other issue or concerns right now.      Self-management goals:  Get out more    Counseling and coordination activities with: ROWAN Giles    Follow-up date: monthly

## 2017-06-05 NOTE — PROGRESS NOTES
Patient present presents for removal of his stitches after his lesion on his left arm was excisionally biopsied on 5/15/17.  He says the wound has been healing well.  Denies any purulent discharge, fevers or chills.    Exam: Well approximated horizontal scar, with 4 stitches.  These were removed.    Pathology results reviewed revealing pilomatrixoma.  Reviewed with patient and this is a benign growth stemming from an abnormal skin cell and nothing to worry about.  Certainly no malignancy.  Do not expect it to recur.  Patient to follow-up next week as scheduled to go over his chronic medical conditions.    Shahbaz Garcia MD R2  Precepted with: Ricardo Hardy MD    Preceptor Attestation:  I saw and evaluated the patient.  I reviewed the resident physician's history, exam, and treatment plan; and I agree with the documentation by the resident physician.  Supervising Physician:  Ricardo Hardy MD

## 2017-07-11 ENCOUNTER — TELEPHONE (OUTPATIENT)
Dept: FAMILY MEDICINE | Facility: CLINIC | Age: 32
End: 2017-07-11

## 2017-07-11 NOTE — TELEPHONE ENCOUNTER
[facility] Follow-up    Call initiated by clinic.  Message recorded by Fabiana Mendez  Calling for: monthly follow up   Patient: See HERBER French  Phone conversation with: Patient  Patient's Phone number/s: Home number on file [phone] (home)  Available today in the AM on their Home number.  OK to leave message on voice mail? Yes      Major diagnoses and/or issues requiring coordination services  Right ear pain    In the past month, how many times have you been to the Emergency Room? 0  In the past month, how many times have you been hospitalized? 0    Summary notes: I called to check up on the pt, pt stated that his right ear has been hurting.  I told the pt that if its bothering him, we need to get him seen.  Pt said that he would like to come in to be seen.  I told the pt that I will transfer him to the  after we are done.  Pt has been taking his medication as directed.  [facility], and things are going ok.  Pt is doing fine, beside the pear pain, he does not have any other issue or concerns right now.      Self-management goals:  Walk more    Counseling and coordination activities with: ROWAN Giles    Follow-up date: monthly

## 2017-07-13 ENCOUNTER — OFFICE VISIT (OUTPATIENT)
Dept: FAMILY MEDICINE | Facility: CLINIC | Age: 32
End: 2017-07-13

## 2017-07-13 VITALS
OXYGEN SATURATION: 97 % | DIASTOLIC BLOOD PRESSURE: 75 MMHG | WEIGHT: 219 LBS | BODY MASS INDEX: 37.39 KG/M2 | TEMPERATURE: 97.8 F | HEART RATE: 79 BPM | SYSTOLIC BLOOD PRESSURE: 111 MMHG | HEIGHT: 64 IN

## 2017-07-13 DIAGNOSIS — H92.02 EAR PAIN, LEFT: Primary | ICD-10-CM

## 2017-07-13 ASSESSMENT — ANXIETY QUESTIONNAIRES
GAD7 TOTAL SCORE: 0
3. WORRYING TOO MUCH ABOUT DIFFERENT THINGS: NOT AT ALL
IF YOU CHECKED OFF ANY PROBLEMS ON THIS QUESTIONNAIRE, HOW DIFFICULT HAVE THESE PROBLEMS MADE IT FOR YOU TO DO YOUR WORK, TAKE CARE OF THINGS AT HOME, OR GET ALONG WITH OTHER PEOPLE: NOT DIFFICULT AT ALL
2. NOT BEING ABLE TO STOP OR CONTROL WORRYING: NOT AT ALL
7. FEELING AFRAID AS IF SOMETHING AWFUL MIGHT HAPPEN: NOT AT ALL
5. BEING SO RESTLESS THAT IT IS HARD TO SIT STILL: NOT AT ALL
1. FEELING NERVOUS, ANXIOUS, OR ON EDGE: NOT AT ALL
6. BECOMING EASILY ANNOYED OR IRRITABLE: NOT AT ALL

## 2017-07-13 ASSESSMENT — PATIENT HEALTH QUESTIONNAIRE - PHQ9: 5. POOR APPETITE OR OVEREATING: NOT AT ALL

## 2017-07-13 NOTE — MR AVS SNAPSHOT
After Visit Summary   7/13/2017    See HERBER French    MRN: [id number]           Patient Information     Date Of Birth          1985        Visit Information        Provider Department      7/13/2017 10:40 AM Cleo Argueta MD [facility]        Today's Diagnoses     Ear pain, left    -  1      Care Instructions    Use ice when the area hurts and heat at night to relax it.    Make an appointment with ENT for regular ear cleaning on the left ear.    If symptoms do not resolve in the next couple weeks come back into clinic.          Follow-ups after your visit        Who to contact     Please call your clinic at [phone] to:    Ask questions about your health    Make or cancel appointments    Discuss your medicines    Learn about your test results    Speak to your doctor   If you have compliments or concerns about an experience at your clinic, or if you wish to file a complaint, please contact [facility] Patient Relations at [phone] or email us at [email or web address]         Additional Information About Your Visit        MyChart Information     [employer] gives you secure access to your electronic health record. If you see a primary care provider, you can also send messages to your care team and make appointments. If you have questions, please call your primary care clinic.  If you do not have a primary care provider, please call [phone] and they will assist you.      [employer] is an electronic gateway that provides easy, online access to your medical records. With [employer], you can request a clinic appointment, read your test results, renew a prescription or communicate with your care team.     To access your existing account, please contact your [facility] or call [phone] for assistance.        Care EveryWhere ID     This is your Care EveryWhere ID. This could be used by other organizations to access  "your [place] medical records  [id number]        Your Vitals Were     Pulse Temperature Height Pulse Oximetry BMI (Body Mass Index)       79 97.8  F (36.6  C) (Oral) 5' 4\" (162.6 cm) 97% 37.59 kg/m2        Blood Pressure from Last 3 Encounters:   07/13/17 111/75   05/22/17 103/71   05/15/17 114/79    Weight from Last 3 Encounters:   07/13/17 219 lb (99.3 kg)   05/22/17 219 lb (99.3 kg)   05/15/17 220 lb 3.2 oz (99.9 kg)              Today, you had the following     No orders found for display       Primary Care Provider Office Phone # Fax #    Shahbaz Garcia [phone]       [facility] [address] [place] [address]        Equal Access to Services     CANDI [facility] : Hadii aad ku hadasho Soomaali, waaxda luqadaha, qaybta kaalmada adeegyada, waxay idiin hayaan emmanuel napier . So [facility] [phone].    ATENCIÓN: Si habla español, tiene a breaux disposición servicios gratuitos de asistencia lingüística. Llame al [phone].    We comply with applicable federal civil rights laws and [place] laws. We do not discriminate on the basis of race, color, national origin, age, disability sex, sexual orientation or gender identity.            Thank you!     Thank you for choosing [facility]  for your care. Our goal is always to provide you with excellent care. Hearing back from our patients is one way we can continue to improve our services. Please take a few minutes to complete the written survey that you may receive in the mail after your visit with us. Thank you!             Your Updated Medication List - Protect others around you: Learn how to safely use, store and throw away your medicines at www.disposemymeds.org.          This list is accurate as of: 7/13/17 10:53 AM.  Always use your most recent med list.                   Brand Name Dispense Instructions for use Diagnosis    ARIPiprazole 10 MG tablet    ABILIFY     Take 1 tablet (10 mg) by mouth At Bedtime "    Vertigo, Adjustment disorder with mixed anxiety and depressed mood, Acute post-operative pain       ibuprofen 600 MG tablet    ADVIL/MOTRIN    120 tablet    Take 1 tablet (600 mg) by mouth every 6 hours as needed    Chronic midline low back pain without sciatica       meclizine 25 MG tablet    ANTIVERT    30 tablet    Take 1 tablet (25 mg) by mouth every 6 hours as needed for dizziness    Vertigo       order for DME     1 Device    Equipment being ordered: wheeled walker    Major depressive disorder, recurrent episode, severe, without mention of psychotic behavior, Adjustment disorder with mixed anxiety and depressed mood, Vertigo, Acute post-operative pain       prazosin 1 MG capsule    MINIPRESS    30 capsule    Take 2 capsules (2 mg) by mouth At Bedtime    Nightmare       sertraline 100 MG tablet    ZOLOFT    60 tablet    Take 2 tablets (200 mg) by mouth daily    Adjustment disorder with mixed anxiety and depressed mood, Vertigo, Acute post-operative pain       traZODone 50 MG tablet    DESYREL    30 tablet    Take 1 tablet (50 mg) by mouth At Bedtime        VIREAD 300 MG tablet   Generic drug:  tenofovir      Take 1 tablet (300 mg) by mouth daily

## 2017-07-13 NOTE — PROGRESS NOTES
HPI       See HERBER French is a 31 year old male who presents for:  Chief Complaint   Patient presents with     Ear Problem     Right ear pained, since last week. Left ear had draining, but doesn't hurt. No concerns.     Ear pain  Started last week. No known trauma. Starts near the mastoid and radiates to the ear. Characterized as a sharp pain. Only present after he exercises for about 30 minutes and self-resolves. Different from symptoms he has had in the left ear (cholesteotoma, last saw ENT about 4 months ago). No fevers/chills, no drainage, no other associated symptoms.    Patient Active Problem List    Diagnosis Date Noted     Severe vertigo 09/15/2015     Priority: Medium     [facility] location  Limited his ability to drive       Back pain 09/11/2015     Priority: Medium     Vertigo 09/11/2015     Priority: Medium     H/O suicide attempt 08/31/2015     Priority: Medium     Walked into traffic 2/2015; hospitalized at [facility] 08/31/2015     Priority: Medium     Health Care Home 07/29/2015     Priority: Medium     Atypical chest pain 05/17/2015     Priority: Medium     Stress echo 5/2015:  Summary   Definity contrast utilized   Subjectively positive. Patient reported right sided chest discomfort with   exercise.   No ECG changes to suggest ischemia at the work load achieved.   Resting LV function is normal with resting LVEF estimated at 60%. Post   exercise LV function is appropriately augmented without observed wall   motion abnormality suggesting a negative stress echocardiogram at the work   load achieved.   Reduced exercise tolerance.   Study is non-diagnostic secondary to less than 85% of maximum heart rate   achieved decreasing test accuracy.   Double product 19,000         Cholesteatoma 05/17/2015     Priority: Medium     Surgery 5/2015 by Dr. Shields (ENT). Dizziness.       Severe episode of recurrent major depressive disorder, with psychotic features  (H) 11/19/2014     Priority: [facility]     Mental Health - Donna French ([facility])  Sees Beba Ojeda - Melly Walton CNP  On aripiprazole 6mg, prazosin prn, sertraline  Auditory hallucinations - tell him he is worthless       Chronic hepatitis B virus infection (H) 11/11/2014     Priority: Medium     [facility] 9/1/16: HBV pcr 3,873,770 -> Now on viread  F/u [place] [facility] Dec 2016    Hep Be antigen +, Hep Be antibody -, AFP wnl, HBV copies 35809= (high titer)  HBV1C genotype from 12/5/2011  Had been on rifampin and switched to tenofovir 3/2012, was on this for about 6 months.         LTBI (latent tuberculosis infection) 11/11/2014     Priority: Medium     Has had treatment, no documented completed treatment.   +TST x3 and Quantiferon positive on 10/12/10. Did have BCG as infant.   Negative chest xray to date, last 10/7/10           Current Outpatient Prescriptions on File Prior to Visit:  traZODone (DESYREL) 50 MG tablet Take 1 tablet (50 mg) by mouth At Bedtime   ARIPiprazole (ABILIFY) 10 MG tablet Take 1 tablet (10 mg) by mouth At Bedtime   tenofovir (VIREAD) 300 MG tablet Take 1 tablet (300 mg) by mouth daily   ibuprofen (ADVIL,MOTRIN) 600 MG tablet Take 1 tablet (600 mg) by mouth every 6 hours as needed   prazosin (MINIPRESS) 1 MG capsule Take 2 capsules (2 mg) by mouth At Bedtime   sertraline (ZOLOFT) 100 MG tablet Take 2 tablets (200 mg) by mouth daily   meclizine (ANTIVERT) 25 MG tablet Take 1 tablet (25 mg) by mouth every 6 hours as needed for dizziness   order for DME Equipment being ordered: wheeled walker     No current facility-administered medications on file prior to visit.        Allergies   Allergen Reactions     Influenza Vac Split [Flu Virus Vaccine]      Bump on arm              Review of Systems:   Complete ROS completed, negative except for as above in HPI            Physical Exam:     Vitals:    07/13/17 1023   BP: 111/75   Pulse: 79   Temp: 97.8  F (36.6  C)   TempSrc: Oral   SpO2: 97%  "  Weight: 219 lb (99.3 kg)   Height: 5' 4\" (162.6 cm)     Body mass index is 37.59 kg/(m^2).  Vitals were reviewed and were normal    General: Alert and orientated in NAD. Pleasant and cooperative.   HEENT: EOMI, sclera without injection or icterus. Mucus membranes moist  - Ears: Left ear with moderate amount of cerumen, most prominent inferior. Right ear with mild-moderate erythema in the inferior portion of the TM and external canal, otherwise unremarkable.  Face: no TMJ tenderness. No reproducible pain with palpation to mastoid process.  Lymphadenopathy: none  Cards: Extremities warm and well-perfused.  Resp: No increased work of breathing  MSK: moving all extremities w/o difficulty or deficit  Skin: no rashes, lesions, or lacerations  Psych: mood good, affect appropriate    Assessment and Plan     1. Ear pain, left  Likely d/t either muscle insertion pain or eustachian tube dysfunction as it only occurs after riding bike for 30 minutes. No concerns for infection or cholesteatoma at this time given hx and unremarkable examination. Cervical nerve pain also a possibility though less likely.  - Use ice when the area hurts and heat at night to relax it.  - Make an appointment with ENT for regular ear cleaning on the left ear.  - If symptoms do not resolve in the next couple weeks come back into clinic.    Options for treatment and follow-up care were reviewed with the patient. Leighton French  engaged in the decision making process and verbalized understanding of the options discussed and agreed with the final plan.    Precepted with: MD Cleo Lozano MD (PGY2)  Pager: [phone]  [facility] Resident            "

## 2017-07-13 NOTE — PATIENT INSTRUCTIONS
Use ice when the area hurts and heat at night to relax it.    Make an appointment with ENT for regular ear cleaning on the left ear.    If symptoms do not resolve in the next couple weeks come back into clinic.

## 2017-07-13 NOTE — PROGRESS NOTES
Preceptor Attestation:  Patient's case reviewed and discussed with Cleo Argueta MD resident and I evaluated the patient. I agree with written assessment and plan of care.  Supervising Physician:Boris Goldberg MD    [facility]

## 2017-07-14 ASSESSMENT — PATIENT HEALTH QUESTIONNAIRE - PHQ9: SUM OF ALL RESPONSES TO PHQ QUESTIONS 1-9: 9

## 2017-07-14 ASSESSMENT — ANXIETY QUESTIONNAIRES: GAD7 TOTAL SCORE: 0

## 2017-08-03 ENCOUNTER — TELEPHONE (OUTPATIENT)
Dept: FAMILY MEDICINE | Facility: CLINIC | Age: 32
End: 2017-08-03

## 2017-08-03 NOTE — TELEPHONE ENCOUNTER
[facility] Follow-up    Call initiated by clinic.  Message recorded by Fabiana Mendez  Calling for: monthly follow up   Patient: See HERBER French  Phone conversation with: Patient  Patient's Phone number/s: Home number on file [phone] (home)  Available today in the PM on their Home number.  OK to leave message on voice mail? Yes      Major diagnoses and/or issues requiring coordination services  Vertigo     In the past month, how many times have you been to the Emergency Room? 0  In the past month, how many times have you been hospitalized? 0    Summary notes: I called to check up on the pt, pt stated that he is doing the same.  Pt stated that he is taking his medication as directed.  Pt stated that he is not going to the [facility] anymore.  Pt stated that they just had him do some of the exercise they taught him.  Pt stated that it is helping a little, but he still feels dizzy at times.  Pt is doing fine for now, and he does not have any other issue or concerns right now.      Self-management goals:  exercise    Counseling and coordination activities with: ROWAN Giles    Follow-up date: monthly

## 2017-08-29 ENCOUNTER — TRANSFERRED RECORDS (OUTPATIENT)
Dept: HEALTH INFORMATION MANAGEMENT | Facility: CLINIC | Age: 32
End: 2017-08-29

## 2017-09-06 ENCOUNTER — TELEPHONE (OUTPATIENT)
Dept: FAMILY MEDICINE | Facility: CLINIC | Age: 32
End: 2017-09-06

## 2017-09-06 NOTE — TELEPHONE ENCOUNTER
[facility] Follow-up    Call initiated by clinic.  Message recorded by Fabiana Mendez  Calling for: monthly follow up   Patient: See HERBER French  Phone conversation with: Patient  Patient's Phone number/s: Home number on file [phone] (home)  Available today in the PM on their Home number.  OK to leave message on voice mail? Yes      Major diagnoses and/or issues requiring coordination services  Vertigo     In the past month, how many times have you been to the Emergency Room? 0  In the past month, how many times have you been hospitalized? 0    Summary notes: I called to check up on the pt, pt said that he is doing the same.  Pt said that he is taking his medication as directed.  Pt stated that he is not [facility], since he was charged a fee for cancelling his appointment less than 24 hours before his appointment.  Pt said that he still has the dizzy spells.  Pt said that he would like to follow up with .  I put the pt on hold, and will talk to the  to see what is available.      I talked to the , the pt was set up to come in to see  tomorrow at 11:00am.     Self-management goals:  Exercise more    Counseling and coordination activities with: ROWAN Giles  Follow-up date: monthly

## 2017-09-07 ENCOUNTER — OFFICE VISIT (OUTPATIENT)
Dept: FAMILY MEDICINE | Facility: CLINIC | Age: 32
End: 2017-09-07

## 2017-09-07 VITALS
WEIGHT: 221.6 LBS | TEMPERATURE: 97.6 F | HEART RATE: 73 BPM | OXYGEN SATURATION: 95 % | SYSTOLIC BLOOD PRESSURE: 108 MMHG | DIASTOLIC BLOOD PRESSURE: 73 MMHG | HEIGHT: 64 IN | BODY MASS INDEX: 37.83 KG/M2

## 2017-09-07 DIAGNOSIS — H71.92 CHOLESTEATOMA, LEFT: ICD-10-CM

## 2017-09-07 DIAGNOSIS — R55 SYNCOPE, UNSPECIFIED SYNCOPE TYPE: Primary | ICD-10-CM

## 2017-09-07 DIAGNOSIS — B18.1 CHRONIC HEPATITIS B VIRUS INFECTION (H): Chronic | ICD-10-CM

## 2017-09-07 DIAGNOSIS — R42 SEVERE VERTIGO: ICD-10-CM

## 2017-09-07 ASSESSMENT — ANXIETY QUESTIONNAIRES
5. BEING SO RESTLESS THAT IT IS HARD TO SIT STILL: NOT AT ALL
IF YOU CHECKED OFF ANY PROBLEMS ON THIS QUESTIONNAIRE, HOW DIFFICULT HAVE THESE PROBLEMS MADE IT FOR YOU TO DO YOUR WORK, TAKE CARE OF THINGS AT HOME, OR GET ALONG WITH OTHER PEOPLE: SOMEWHAT DIFFICULT
6. BECOMING EASILY ANNOYED OR IRRITABLE: SEVERAL DAYS
2. NOT BEING ABLE TO STOP OR CONTROL WORRYING: NOT AT ALL
7. FEELING AFRAID AS IF SOMETHING AWFUL MIGHT HAPPEN: NOT AT ALL
GAD7 TOTAL SCORE: 2
3. WORRYING TOO MUCH ABOUT DIFFERENT THINGS: NOT AT ALL
1. FEELING NERVOUS, ANXIOUS, OR ON EDGE: NOT AT ALL

## 2017-09-07 ASSESSMENT — PATIENT HEALTH QUESTIONNAIRE - PHQ9
5. POOR APPETITE OR OVEREATING: SEVERAL DAYS
SUM OF ALL RESPONSES TO PHQ QUESTIONS 1-9: 6

## 2017-09-07 NOTE — MR AVS SNAPSHOT
After Visit Summary   9/7/2017    See HERBER French    MRN: [id number]           Patient Information     Date Of Birth          1985        Visit Information        Provider Department      9/7/2017 11:00 AM Shahbaz Garcia MD [facility]        Today's Diagnoses     Syncope, unspecified syncope type    -  1    Severe vertigo        Cholesteatoma, left        Chronic hepatitis B virus infection (H)           Follow-ups after your visit        Your next 10 appointments already scheduled     Sep 21, 2017  9:00 AM CDT   LAB VISIT with [facility]   [facility] ([facility])    [address] [place] [address]   [phone]           If you are coming in for fasting labs, you will need to fast for 10-12 hours prior to your appt. Fasting labs include lipids, cholesterol, glucose, complete metabolic panel, basic metabolic panel, and triglycerides. Do not drink coffee or any other fluids. Water with medications are okay. Do not chew gum as well. If you have any further questions, please contact your health care team.                Sep 28, 2017 10:20 AM CDT   Return Visit with Shahbaz Garcia MD   [facility] ([facility])    [address] [place] [address]   [phone]              Who to contact     Please call your clinic at [phone] to:    Ask questions about your health    Make or cancel appointments    Discuss your medicines    Learn about your test results    Speak to your doctor   If you have compliments or concerns about an experience at your clinic, or if you wish to file a complaint, please contact [facility] Patient Relations at [phone] or email us at [email or web address]         Additional Information About Your Visit        MyChart Information     [employer] gives you secure access to your electronic health record. If you see a primary care provider, you can also send messages to  "your care team and make appointments. If you have questions, please call your primary care clinic.  If you do not have a primary care provider, please call [phone] and they will assist you.      [employer] is an electronic gateway that provides easy, online access to your medical records. With [employer], you can request a clinic appointment, read your test results, renew a prescription or communicate with your care team.     To access your existing account, please contact your [facility] or call [phone] for assistance.        Care EveryWhere ID     This is your Care EveryWhere ID. This could be used by other organizations to access your [place] medical records  [id number]        Your Vitals Were     Pulse Temperature Height Pulse Oximetry BMI (Body Mass Index)       73 97.6  F (36.4  C) (Oral) 5' 3.5\" (161.3 cm) 95% 38.64 kg/m2        Blood Pressure from Last 3 Encounters:   09/14/17 108/72   09/07/17 108/73   07/13/17 111/75    Weight from Last 3 Encounters:   09/14/17 216 lb 6.4 oz (98.2 kg)   09/07/17 221 lb 9.6 oz (100.5 kg)   07/13/17 219 lb (99.3 kg)              We Performed the Following     EKG 12-lead complete w/read - Clinics        Primary Care Provider Office Phone # Fax #    Shahbaz Garcia [phone]       [facility] [address] [place] [address]        Equal Access to Services     MICHELLE NATH AH: Hadii aad ku hadasho Soomaali, waaxda luqadaha, qaybta kaalmada adeegyada, forrest idiin haybaltazar napier . So [facility] [phone].    ATENCIÓN: Si habla aubrey, tiene a breaux disposición servicios gratuitos de asistencia lingüística. Llame al [phone].    We comply with applicable federal civil rights laws and [place] laws. We do not discriminate on the basis of race, color, national origin, age, disability sex, sexual orientation or gender identity.            Thank you!     Thank you for choosing [facility]  for your care. Our goal is always to provide you with excellent care. Hearing back from our patients is one way we can continue to improve our services. Please take a few minutes to complete the written survey that you may receive in the mail after your visit with us. Thank you!             Your Updated Medication List - Protect others around you: Learn how to safely use, store and throw away your medicines at www.disposemymeds.org.          This list is accurate as of: 9/7/17 11:59 PM.  Always use your most recent med list.                   Brand Name Dispense Instructions for use Diagnosis    ARIPiprazole 10 MG tablet    ABILIFY     Take 1 tablet (10 mg) by mouth At Bedtime    Vertigo, Adjustment disorder with mixed anxiety and depressed mood, Acute post-operative pain       ibuprofen 600 MG tablet    ADVIL/MOTRIN    120 tablet    Take 1 tablet (600 mg) by mouth every 6 hours as needed    Chronic midline low back pain without sciatica       meclizine 25 MG tablet    ANTIVERT    30 tablet    Take 1 tablet (25 mg) by mouth every 6 hours as needed for dizziness    Vertigo       order for DME     1 Device    Equipment being ordered: wheeled walker    Major depressive disorder, recurrent episode, severe, without mention of psychotic behavior, Adjustment disorder with mixed anxiety and depressed mood, Vertigo, Acute post-operative pain       prazosin 1 MG capsule    MINIPRESS    30 capsule    Take 2 capsules (2 mg) by mouth At Bedtime    Nightmare       sertraline 100 MG tablet    ZOLOFT    60 tablet    Take 2 tablets (200 mg) by mouth daily    Adjustment disorder with mixed anxiety and depressed mood, Vertigo, Acute post-operative pain       traZODone 50 MG tablet    DESYREL    30 tablet    Take 1 tablet (50 mg) by mouth At Bedtime        VIREAD 300 MG tablet   Generic drug:  tenofovir      Take 1 tablet (300 mg) by mouth daily

## 2017-09-07 NOTE — PROGRESS NOTES
"[facility]        Subjective   HPI: See HERBER French is a 31 year old male with history of hep B, depression who presents as an established patient for the followin. Depression: feels mostly due to his chronic medical issues. Did go fishing last month and caught a huge fish.     2. Gynecomastia - seems to be getting worse. He reports today that his psychiatrist feels that it isn't due to his abilify    3.  Hep B: was seen over the summer at [facility], no records yet. continues on his tenofovir daily.    4.  He continues to have chronic ear drainage from his left ear.  He reports that his wife thinks it is foul-smelling. Second opinion from  ENT a few months ago - he doesn't think they had much else to offer.    5. Does note he passed out while fishing last month, falling down into the water. Witnessed. Was slightly lightheaded before it happened. No Fam hx of sudden unexplained death. No post-ictal state.    ROS: constitutional, cardiovascular, respiratory, GI, , MSK systems reviewed and negative except as noted above.    Social:  Lives with wife and children x3. Unemployed, on disability.          Objective   /73  Pulse 73  Temp 97.6  F (36.4  C) (Oral)  Ht 5' 3.5\" (161.3 cm)  Wt 221 lb 9.6 oz (100.5 kg)  SpO2 95%  BMI 38.64 kg/m2 Body mass index is 38.64 kg/(m^2).  Gen: healthy, alert and no distress,  HEENT: left ear w/small amount drainage  Pulm: lungs clear to auscultation - no rales, rhonchi or wheezes  CV: regular rate and rhythm,  and no murmur, click,  rub or gallop  Chest: significant gynecomastia bilaterally  Abd: soft, nontender, obese  Back: nontender to palpation  Neuro: Normal strength and tone, sensory exam grossly normal, mentation appears intact and speech normal  Psych: mood and affect flat           Assessment & Plan     # hepatitis B:  Managed at [facility], switched to tenofovir 2016. Last viral load from 2017 of 3.8 million. GI concerned about compliance at that " time but he reliably fills his tenofovir per pharmacy records. Did see [facility] over the summer, asked [facility] to obtain recrods.     # Left cholesteatoma: Follows with Dr. Shields after surgery 5/2015, needs cleanings q3-4 months.  Had second opinion at [facility] summer 2017, awaiting records. Chronic dizziness. Will send him back to ENT for repeat cleaning, [facility] will set up.     # syncope: came up at end of visit while talking about his fishing trip. did not have time to fully address today. EKG normal. Likely was secondary to dehydration (was hot day, not usually outside) but will review at future visit further.     ---------------  Chronic issues not addressed today:     # Severe depression with psychotic features (auditory hallucinations telling him he is worthless): Follows at [facility] - Melly Walton is psych NP. On aripiprazole 10mg, prazosin 1, sertraline 200, trazodone 50.      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 BCG as infant. Some treatment in past, unclear where.    F/u 1 week to evaluate gynecomastia, syncope    Precepted with: Beatriz Garcia MD    -------  PMH:  Patient Active Problem List   Diagnosis     Chronic hepatitis B virus infection (H)     LTBI (latent tuberculosis infection)     Severe episode of recurrent major depressive disorder, with psychotic features (H)     Atypical chest pain     Cholesteatoma     Health Care Home     H/O suicide attempt     Nightmare     Back pain     Vertigo     Severe vertigo      Current Outpatient Prescriptions   Medication     traZODone (DESYREL) 50 MG tablet     ARIPiprazole (ABILIFY) 10 MG tablet     tenofovir (VIREAD) 300 MG tablet     ibuprofen (ADVIL,MOTRIN) 600 MG tablet     prazosin (MINIPRESS) 1 MG capsule     sertraline (ZOLOFT) 100 MG tablet     meclizine (ANTIVERT) 25 MG tablet     order for DME     No current facility-administered medications for this visit.       ALLERGIES: Influenza vac split [flu virus vaccine]

## 2017-09-08 ASSESSMENT — ANXIETY QUESTIONNAIRES: GAD7 TOTAL SCORE: 2

## 2017-09-14 ENCOUNTER — OFFICE VISIT (OUTPATIENT)
Dept: FAMILY MEDICINE | Facility: CLINIC | Age: 32
End: 2017-09-14

## 2017-09-14 VITALS
HEART RATE: 79 BPM | TEMPERATURE: 98.1 F | DIASTOLIC BLOOD PRESSURE: 72 MMHG | SYSTOLIC BLOOD PRESSURE: 108 MMHG | OXYGEN SATURATION: 95 % | WEIGHT: 216.4 LBS | BODY MASS INDEX: 36.95 KG/M2 | HEIGHT: 64 IN

## 2017-09-14 DIAGNOSIS — N62 HYPERTROPHY OF BREAST: Primary | ICD-10-CM

## 2017-09-14 DIAGNOSIS — F33.3 SEVERE EPISODE OF RECURRENT MAJOR DEPRESSIVE DISORDER, WITH PSYCHOTIC FEATURES (H): ICD-10-CM

## 2017-09-14 NOTE — MR AVS SNAPSHOT
After Visit Summary   9/14/2017    See HERBER French    MRN: [id number]           Patient Information     Date Of Birth          1985        Visit Information        Provider Department      9/14/2017 10:20 AM Shahbaz Garcia MD [facility]        Today's Diagnoses     Hypertrophy of breast    -  1    Severe episode of recurrent major depressive disorder, with psychotic features (H)           Follow-ups after your visit        Your next 10 appointments already scheduled     Sep 21, 2017  9:00 AM CDT   LAB VISIT with [facility]   [facility] ([facility])    [address] [place] [id number]   [phone]           If you are coming in for fasting labs, you will need to fast for 10-12 hours prior to your appt. Fasting labs include lipids, cholesterol, glucose, complete metabolic panel, basic metabolic panel, and triglycerides. Do not drink coffee or any other fluids. Water with medications are okay. Do not chew gum as well. If you have any further questions, please contact your health care team.                Sep 28, 2017 10:20 AM CDT   Return Visit with Shahbaz Garcia MD   [facility] ([facility])    [address] [place] [id number]   [phone]              Future tests that were ordered for you today     Open Future Orders        Priority Expected Expires Ordered    Hemoglobin A1c ([facility]) Routine  10/15/2017 9/15/2017    Lipid Panel ([facility]) - Results < 1 hr Routine  10/15/2017 9/15/2017    Basic Metabolic Panel ([facility]) - Results < 1 hr Routine  10/15/2017 9/15/2017    HCG quantitative pregnancy Routine  10/15/2017 9/15/2017    Testosterone Total ([facility]) Routine  10/15/2017 9/15/2017    Estradiol ([facility]) Routine  10/15/2017 9/15/2017    LH ([facility]) Routine 9/21/2017 12/23/2017 9/14/2017            Who to contact     Please call your clinic at [phone] to:    Ask questions about your health    Make or cancel  "appointments    Discuss your medicines    Learn about your test results    Speak to your doctor   If you have compliments or concerns about an experience at your clinic, or if you wish to file a complaint, please contact [facility] Patient Relations at [phone] or email us at [email or web address]         Additional Information About Your Visit        MyChart Information     [employer] gives you secure access to your electronic health record. If you see a primary care provider, you can also send messages to your care team and make appointments. If you have questions, please call your primary care clinic.  If you do not have a primary care provider, please call [phone] and they will assist you.      [employer] is an electronic gateway that provides easy, online access to your medical records. With [employer], you can request a clinic appointment, read your test results, renew a prescription or communicate with your care team.     To access your existing account, please contact your [facility] Clinic or call [phone] for assistance.        Care EveryWhere ID     This is your Care EveryWhere ID. This could be used by other organizations to access your [place] medical records  [id number]        Your Vitals Were     Pulse Temperature Height Pulse Oximetry BMI (Body Mass Index)       79 98.1  F (36.7  C) (Oral) 5' 4\" (162.6 cm) 95% 37.14 kg/m2        Blood Pressure from Last 3 Encounters:   09/14/17 108/72   09/07/17 108/73   07/13/17 111/75    Weight from Last 3 Encounters:   09/14/17 216 lb 6.4 oz (98.2 kg)   09/07/17 221 lb 9.6 oz (100.5 kg)   07/13/17 219 lb (99.3 kg)               Primary Care Provider Office Phone # Fax #    Shahbaz Garcia [phone]       [facility] [address] [place] [address]        Equal Access to Services     MICHELLE NATH AH: Hadii boy Nolan, waaxda luqadaha, qaybta " forrest hoodkait napier ah. So [facility] [phone].    ATENCIÓN: Si lupe burgos, tiene a breaux disposición servicios gratuitos de asistencia lingüística. Marco al [phone].    We comply with applicable federal civil rights laws and [place] laws. We do not discriminate on the basis of race, color, national origin, age, disability sex, sexual orientation or gender identity.            Thank you!     Thank you for choosing [facility]  for your care. Our goal is always to provide you with excellent care. Hearing back from our patients is one way we can continue to improve our services. Please take a few minutes to complete the written survey that you may receive in the mail after your visit with us. Thank you!             Your Updated Medication List - Protect others around you: Learn how to safely use, store and throw away your medicines at www.disposemymeds.org.          This list is accurate as of: 9/14/17 11:59 PM.  Always use your most recent med list.                   Brand Name Dispense Instructions for use Diagnosis    ARIPiprazole 10 MG tablet    ABILIFY     Take 1 tablet (10 mg) by mouth At Bedtime    Vertigo, Adjustment disorder with mixed anxiety and depressed mood, Acute post-operative pain       ibuprofen 600 MG tablet    ADVIL/MOTRIN    120 tablet    Take 1 tablet (600 mg) by mouth every 6 hours as needed    Chronic midline low back pain without sciatica       meclizine 25 MG tablet    ANTIVERT    30 tablet    Take 1 tablet (25 mg) by mouth every 6 hours as needed for dizziness    Vertigo       order for [facility]     1 Device    Equipment being ordered: wheeled walker    Major depressive disorder, recurrent episode, severe, without mention of psychotic behavior, Adjustment disorder with mixed anxiety and depressed mood, Vertigo, Acute post-operative pain       prazosin 1 MG capsule    MINIPRESS    30 capsule    Take 2 capsules (2 mg) by mouth At Bedtime     Nightmare       sertraline 100 MG tablet    ZOLOFT    60 tablet    Take 2 tablets (200 mg) by mouth daily    Adjustment disorder with mixed anxiety and depressed mood, Vertigo, Acute post-operative pain       traZODone 50 MG tablet    DESYREL    30 tablet    Take 1 tablet (50 mg) by mouth At Bedtime        VIREAD 300 MG tablet   Generic drug:  tenofovir      Take 1 tablet (300 mg) by mouth daily

## 2017-09-14 NOTE — PROGRESS NOTES
"[facility]        Subjective   HPI: See HERBER French is a 31 year old male with history of hep B, depression who presents as an established patient for the followin) Gynecomastia: Started a few years ago - not sure when. No discharge. It is tender to palpation on both sides. Slowly enlarging. Finds it to be bothersome, makes his mood worse, would like it if they were normal again. No testicular changes. No difficulties with erections or ejaculations. Stable body / facial hair, hasn't changed over the years. No headaches. No vomiting. No blurry eyes. Does not drive but thinks his peripheral vision is good.    ROS: constitutional, cardiovascular, respiratory, GI, , MSK systems reviewed and negative except as noted above.    Social:  Lives with wife and children. Unemployed.          Objective   /72  Pulse 79  Temp 98.1  F (36.7  C) (Oral)  Ht 5' 4\" (162.6 cm)  Wt 216 lb 6.4 oz (98.2 kg)  SpO2 95%  BMI 37.14 kg/m2 Body mass index is 37.14 kg/(m^2).  Gen: healthy, alert and no distress,  Breast: appears to be about 6.5cm of actual breast tissue on left (measured vertically), 5cm on right. No lymphadenopathy or nodules palpated in the breast or the axillae.   : testicles normal, right slightly smaller than the left         Assessment & Plan     # Gynecomastia  None of his medications cause this at more than a 1% frequency. He is obese with a BMI of 37 so this could be contributing but given relatively recent onset and breast size will obtain serum hCG, LH, testosterone & estradiol in morning draw next week.    # Severe depression with psychotic features (auditory hallucinations telling him he is worthless): Follows at [facility] - Melly Walton is psych NP. On aripiprazole 10mg, prazosin 1, sertraline 200, trazodone 50. Due for metabolic labs per psych note from July. Will obtain lipids, BMP, hgb a1c, fasting glucose with lab draw, will need to fax to " [facility].    ---------------  Chronic issues not addressed today:    # hepatitis B:  Managed at [facility], switched to tenofovir 9/2016. Last viral load from 2/2017 of 3.8 million. GI concerned about compliance at that time but he reliably fills his tenofovir per pharmacy records. Did see [facility] over the summer, we are awaiting records.  ADDENDUM: records reviewed from 8/29 [facility] visit. Reports has been off tenofovir for at least a month as he just hasn't filled it and has taken it intermittently (this is in contrast to what he has told us and pharmacy fill records). They were going to obtain repeat labs at that time, restart his tenofovir 300mg qd, and then repeat labs in 2-3 months. We will try to obtain the lab results from [facility] and see if his next appointment can be changed to be a co-visit with the pharmacist.    ADDENDUM 9/20: labs reviewed. , AST 52, Hep Be Ag +, HBV 2.7 million copies. [facility] advised recheck in 2 months (end Oct/Early Nov).     # Left cholesteatoma: Follows with Dr. Shields after surgery 5/2015, needs cleanings q3-4 months.  Had second opinion at [facility] summer 2017, awaiting records. Chronic dizziness.    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 BCG as infant. Some treatment in past, unclear where.  ---------------    F/u 2 weeks to discuss labs    Shahbaz Garcia MD R3  Precepted with: Johana Key MD        -------  PMH:  Patient Active Problem List   Diagnosis     Chronic hepatitis B virus infection (H)     LTBI (latent tuberculosis infection)     Severe episode of recurrent major depressive disorder, with psychotic features (H)     Atypical chest pain     Cholesteatoma     Health Care Home     H/O suicide attempt     Nightmare     Back pain     Vertigo     Severe vertigo      Current Outpatient Prescriptions   Medication     traZODone (DESYREL) 50 MG tablet     ARIPiprazole (ABILIFY) 10 MG tablet     tenofovir (VIREAD) 300 MG tablet     ibuprofen (ADVIL,MOTRIN) 600 MG tablet     prazosin  (MINIPRESS) 1 MG capsule     sertraline (ZOLOFT) 100 MG tablet     meclizine (ANTIVERT) 25 MG tablet     order for DME     No current facility-administered medications for this visit.       ALLERGIES: Influenza vac split [flu virus vaccine]

## 2017-09-14 NOTE — PROGRESS NOTES
Preceptor Attestation:  Patient's case reviewed and discussed with Shahbaz Garcia MD resident and I evaluated the patient. I agree with written assessment and plan of care.  Supervising Physician:  ALE ADLER MD  [facility]

## 2017-09-15 ENCOUNTER — TELEPHONE (OUTPATIENT)
Dept: FAMILY MEDICINE | Facility: CLINIC | Age: 32
End: 2017-09-15

## 2017-09-15 NOTE — TELEPHONE ENCOUNTER
I got a message from  about helping the pt with setting up a ENT appointment and getting the last visit note from [facility].  I called [facility] to get the last visit notes.  They were able to fax it over to me.  As for the ENT appointment, I called [facility] [place] and set the pt up for 09/21/17 at 9:00am.      I called the pt and informed him of his appointment time and date.

## 2017-09-18 ENCOUNTER — TELEPHONE (OUTPATIENT)
Dept: FAMILY MEDICINE | Facility: CLINIC | Age: 32
End: 2017-09-18

## 2017-09-18 NOTE — PROGRESS NOTES
Preceptor Attestation:  Patient's case reviewed and discussed with Shahbaz Garcia MD.  Patient seen and discussed with the resident. and I personally viewed the EKG and agree with the interpretation documented by the resident.  I agree with assessment and plan of care.  Supervising Physician:  Beatriz Villanueva MD  [facility]

## 2017-09-18 NOTE — TELEPHONE ENCOUNTER
I got a message from  about getting the pt labs results from [facility] for 08/29/17.  I called [facility] to get the pt lab results.      They faxed me the pt lab results, I made a copy of it. I put one copy on  desk, and one into records to be scanned into the pt file.

## 2017-10-03 ENCOUNTER — TELEPHONE (OUTPATIENT)
Dept: FAMILY MEDICINE | Facility: CLINIC | Age: 32
End: 2017-10-03

## 2017-10-03 NOTE — TELEPHONE ENCOUNTER
[facility] Follow-up    Call initiated by clinic.  Message recorded by Fabiana Mendez  Calling for: monthly follow up   Patient: See HEBRER French  Phone conversation with: Patient  Patient's Phone number/s: Home number on file [phone] (home)  Available today in the PM on their Home number.  OK to leave message on voice mail? Yes      Major diagnoses and/or issues requiring coordination services  Dizziness     In the past month, how many times have you been to the Emergency Room? 0  In the past month, how many times have you been hospitalized? 0    Summary notes: I called to check up on the pt, pt stated that he is doing the same.  Pt stated that he is taking the his medication as directed.  Pt stated that he still has his dizzy spells.  I asked him to see if I can [facility]  Pt stated that he does not want to go there any more.  Pt stated that they charged him for not calling in to cancel his appointment when he was sick.  Pt also stated that [facility], they just told him to do some exercise, which he is doing now.  Pt said that he is ok, [facility].  Pt is doing fine, he does not have any other issue or concerns right now.    Self-management goals:  Get out more    Counseling and coordination activities with: ROWAN Giles    Follow-up date: monthly

## 2017-11-06 ENCOUNTER — TELEPHONE (OUTPATIENT)
Dept: FAMILY MEDICINE | Facility: CLINIC | Age: 32
End: 2017-11-06

## 2017-11-06 NOTE — TELEPHONE ENCOUNTER
[facility] Follow-up    Call initiated by clinic.  Message recorded by Fabiana Mendez  Calling for: monthly follow up   Patient: See HERBER French  Phone conversation with: Patient  Patient's Phone number/s: Home number on file [phone] (home)  Available today in the PM on their Home number.  OK to leave message on voice mail? Yes      Major diagnoses and/or issues requiring coordination services      In the past month, how many times have you been to the Emergency Room? 0  In the past month, how many times have you been hospitalized? 0    Summary notes: I called to check up on the pt, pt did not answer his phone.  I left a vm for the pt to give me a call back.      Self-management goals:      Counseling and coordination activities with: ROWAN Giles    Follow-up date: monthly

## 2017-12-04 ENCOUNTER — TELEPHONE (OUTPATIENT)
Dept: FAMILY MEDICINE | Facility: CLINIC | Age: 32
End: 2017-12-04

## 2017-12-04 NOTE — TELEPHONE ENCOUNTER
[facility] Follow-up    Call initiated by clinic.  Message recorded by Fabiana Mendez  Calling for: monthly follow up   Patient: See HERBER French  Phone conversation with: Patient  Patient's Phone number/s: Home number on file [phone] (home)  Available today in the AM on their Home number.  OK to leave message on voice mail? Yes      Major diagnoses and/or issues requiring coordination services  Dizziness     In the past month, how many times have you been to the Emergency Room? 0  In the past month, how many times have you been hospitalized? 0    Summary notes: I called to check up on the pt, pt did not answer his phone.  I left a vm for the pt to give me a call back.      Self-management goals:      Counseling and coordination activities with: ROWAN Giles    Follow-up date: monthly

## 2017-12-12 ENCOUNTER — OFFICE VISIT (OUTPATIENT)
Dept: FAMILY MEDICINE | Facility: CLINIC | Age: 32
End: 2017-12-12

## 2017-12-12 VITALS
WEIGHT: 219.4 LBS | HEART RATE: 69 BPM | RESPIRATION RATE: 20 BRPM | HEIGHT: 64 IN | OXYGEN SATURATION: 96 % | SYSTOLIC BLOOD PRESSURE: 107 MMHG | BODY MASS INDEX: 37.46 KG/M2 | DIASTOLIC BLOOD PRESSURE: 74 MMHG | TEMPERATURE: 97.6 F

## 2017-12-12 DIAGNOSIS — N62 HYPERTROPHY OF BREAST: ICD-10-CM

## 2017-12-12 DIAGNOSIS — F33.3 SEVERE EPISODE OF RECURRENT MAJOR DEPRESSIVE DISORDER, WITH PSYCHOTIC FEATURES (H): ICD-10-CM

## 2017-12-12 DIAGNOSIS — M70.50 PES ANSERINE BURSITIS: Primary | ICD-10-CM

## 2017-12-12 DIAGNOSIS — B18.1 CHRONIC HEPATITIS B VIRUS INFECTION (H): Chronic | ICD-10-CM

## 2017-12-12 ASSESSMENT — PATIENT HEALTH QUESTIONNAIRE - PHQ9: SUM OF ALL RESPONSES TO PHQ QUESTIONS 1-9: 6

## 2017-12-12 NOTE — MR AVS SNAPSHOT
After Visit Summary   12/12/2017    See HERBER French    MRN: [id number]           Patient Information     Date Of Birth          1985        Visit Information        Provider Department      12/12/2017 10:20 AM Shahbaz Garcia MD [facility]        Today's Diagnoses     Pes anserine bursitis    -  1    Chronic hepatitis B virus infection (H)        Severe episode of recurrent major depressive disorder, with psychotic features (H)        Hypertrophy of breast          Care Instructions    Referral for Physical Therapy faxed to [facility] and pt will be contacted to schedule appointment.  [place]    Referral for (Test): Physical Therapy   Location/Place/Provider: [facility], [address] [place] [address]   Date/Time: 12/27/17 at 10:00am (Anurag)   Phone: [phone]  [phone]   Additional information/prep.:   Scheduled by: ROWAN Giles          Follow-ups after your visit        Additional Services     PHYSICAL THERAPY REFERRAL       PT/OT REFERRAL  See HERBER French  1985  Phone #: [phone] (home)    needed: No  Language: English    PT/OT  Facility:   Other: per patient preference    History: Foot injury, then developed what appears to be pes anserine bursitis    Precautions/Contraindications: None    Imaging Studies: None    Surgical Procedure/Test Results: None    Treatment Goals:   Increase: Flexibility, Strength and ROM  Decrease: Pain    Prognosis: excellent    Visits: Up to 12    Evaluate: Evaluate and treat    Plan: Manual Therapy, Flexibility Exercise and Strength Exercise                  Your next 10 appointments already scheduled     Jan 08, 2018  2:20 PM CST   Return Visit with Sahhbaz Garcia MD   [facility] ([facility])    [address] [place] [address]   [phone]              Who to contact     Please call your clinic at [phone] to:    Ask questions about your health    Make or cancel  "appointments    Discuss your medicines    Learn about your test results    Speak to your doctor   If you have compliments or concerns about an experience at your clinic, or if you wish to file a complaint, please contact [facility] Patient Relations at [phone] or email us at [email or web address]         Additional Information About Your Visit        [facility] - [facility] Ne[employer] Outreach Programhart Information     [employer] gives you secure access to your electronic health record. If you see a primary care provider, you can also send messages to your care team and make appointments. If you have questions, please call your primary care clinic.  If you do not have a primary care provider, please call [phone] and they will assist you.      [employer] is an electronic gateway that provides easy, online access to your medical records. With [employer], you can request a clinic appointment, read your test results, renew a prescription or communicate with your care team.     To access your existing account, please contact your [facility] or call [phone] for assistance.        Care EveryWhere ID     This is your Care EveryWhere ID. This could be used by other organizations to access your [place] medical records  [id number]        Your Vitals Were     Pulse Temperature Respirations Height Pulse Oximetry BMI (Body Mass Index)    69 97.6  F (36.4  C) (Oral) 20 5' 4\" (162.6 cm) 96% 37.66 kg/m2       Blood Pressure from Last 3 Encounters:   12/12/17 107/74   09/14/17 108/72   09/07/17 108/73    Weight from Last 3 Encounters:   12/12/17 219 lb 6.4 oz (99.5 kg)   09/14/17 216 lb 6.4 oz (98.2 kg)   09/07/17 221 lb 9.6 oz (100.5 kg)                 Today's Medication Changes          These changes are accurate as of: 12/12/17 11:59 PM.  If you have any questions, ask your nurse or doctor.               Start taking these medicines.        Dose/Directions    naproxen 375 MG tablet   Commonly known " as:  NAPROSYN   Used for:  Pes anserine bursitis   Started by:  Shahbaz Garcia MD        Dose:  375 mg   Take 1 tablet (375 mg) by mouth 2 times daily (with meals)   Quantity:  60 tablet   Refills:  0            Where to get your medicines      These medications were sent to [facility] - [place], [place] - [address]  [address], [place] [address]     Phone:  [phone]     naproxen 375 MG tablet                Primary Care Provider Office Phone # Fax #    Shahbaz Garcia [phone]       [facility] [address] [facility] [address]        Equal Access to Services     MICHELLE NATH : Hadii boy ku hadasho Soomaali, waaxda luqadaha, qaybta kaalmada adeegyada, waxay idiin haybaltazar napier . So [facility] [phone].    ATENCIÓN: Si habla español, tiene a breaux disposición servicios gratuitos de asistencia lingüística. Llame al [phone].    We comply with applicable federal civil rights laws and [place] laws. We do not discriminate on the basis of race, color, national origin, age, disability, sex, sexual orientation, or gender identity.            Thank you!     Thank you for choosing [facility]  for your care. Our goal is always to provide you with excellent care. Hearing back from our patients is one way we can continue to improve our services. Please take a few minutes to complete the written survey that you may receive in the mail after your visit with us. Thank you!             Your Updated Medication List - Protect others around you: Learn how to safely use, store and throw away your medicines at www.disposemymeds.org.          This list is accurate as of: 12/12/17 11:59 PM.  Always use your most recent med list.                   Brand Name Dispense Instructions for use Diagnosis    ARIPiprazole 10 MG tablet    ABILIFY     Take 1 tablet (10 mg) by mouth At Bedtime    Vertigo, Adjustment disorder with mixed  anxiety and depressed mood, Acute post-operative pain       ibuprofen 600 MG tablet    ADVIL/MOTRIN    120 tablet    Take 1 tablet (600 mg) by mouth every 6 hours as needed    Chronic midline low back pain without sciatica       meclizine 25 MG tablet    ANTIVERT    30 tablet    Take 1 tablet (25 mg) by mouth every 6 hours as needed for dizziness    Vertigo       naproxen 375 MG tablet    NAPROSYN    60 tablet    Take 1 tablet (375 mg) by mouth 2 times daily (with meals)    Pes anserine bursitis       order for DME     1 Device    Equipment being ordered: wheeled walker    Major depressive disorder, recurrent episode, severe, without mention of psychotic behavior, Adjustment disorder with mixed anxiety and depressed mood, Vertigo, Acute post-operative pain       prazosin 1 MG capsule    MINIPRESS    30 capsule    Take 2 capsules (2 mg) by mouth At Bedtime    Nightmare       sertraline 100 MG tablet    ZOLOFT    60 tablet    Take 2 tablets (200 mg) by mouth daily    Adjustment disorder with mixed anxiety and depressed mood, Vertigo, Acute post-operative pain       traZODone 50 MG tablet    DESYREL    30 tablet    Take 1 tablet (50 mg) by mouth At Bedtime        VIREAD 300 MG tablet   Generic drug:  tenofovir      Take 1 tablet (300 mg) by mouth daily

## 2017-12-12 NOTE — PROGRESS NOTES
"[facility]        Subjective     CC: Patient presents with:  Knee Pain: left knee pain. was feeling dizzy and dropped knife on left foot and been having left knee pain x1 month  Medication Reconciliation: using ear drops, complete      HPI: See HERBER French is a 32 year old male .    1) Was helping in kitchen, dropped knife and hit his foot. It was bleeding. He wrapped it with gauze and bleeding eventually stopped. Since this episode he has been having some pain in his left knee. Worse with ambulation and standing. No fevers nor chills. No knee swelling.     2) still with breast enlargement. Never returned here for labs.    3) Did not go to [facility] for repeat labs in October. Still taking his viread.     ROS: constitutional, cardiovascular, respiratory, GI, , MSK systems reviewed and negative except as noted above.    Social:  nonsmoker          Objective   /74  Pulse 69  Temp 97.6  F (36.4  C) (Oral)  Resp 20  Ht 5' 4\" (162.6 cm)  Wt 219 lb 6.4 oz (99.5 kg)  SpO2 96%  BMI 37.66 kg/m2 Body mass index is 37.66 kg/(m^2).  Gen: healthy, alert and no distress,  HEENT: no adenopathy  Pulm: lungs clear to auscultation - no rales, rhonchi or wheezes  CV: regular rate and rhythm,  and no murmur, click,  rub or gallop  Abd: soft, nontender, obese  LE: Pain at pes anserine with foot invesion of left foot. TTP over the same spot.          Assessment & Plan   Pes anserine bursitis  Suspect this is from him favoring his foot after his injury which appears to have healed well.  For now we will treat with naproxen and and encourage him to go to physical therapy.  Will have to have [facility] help set this up.  - naproxen (NAPROSYN) 375 MG tablet  Dispense: 60 tablet; Refill: 0  - PHYSICAL THERAPY REFERRAL    Chronic hepatitis B virus infection (H)  His viral levels were up back in August.  He is overdue for recheck as [facility] wanted repeat these in 2 months.  Will obtain labs with his draw as below.  - " Basic Metabolic Panel ([facility])  - Hepatitis B DNA Quantitative Real-Time PCR ([facility])  - Hepatic Profile ([facility])  - CBC with Plt ([facility])    Severe episode of recurrent major depressive disorder, with psychotic features (H)  Is on an atypical antipsychotic and is due for monitoring.  - Lipid Panel ([facility]) - Results < 1 hr  - Hemoglobin A1c ([facility])    Hypertrophy of breast  Psychiatry did not feel that his breast hypertrophy was from his medications.  Will obtain labs as we had planned on in the past.  These need to be done first thing in the morning, so will have patient return for a future visit for these and all of his other blood work.  - Beta-HCG Quantitative ([facility])  - LH ([facility])  - Testosterone Total ([facility])  - Estradiol ([facility])    Patient will f/u in January to discuss lab results in person, will not notify him earlier unless something critical.    Precepted today with: MD Shahbaz Chirinos MD    -------  PMH:  Patient Active Problem List   Diagnosis     Chronic hepatitis B virus infection (H)     LTBI (latent tuberculosis infection)     Severe episode of recurrent major depressive disorder, with psychotic features (H)     Atypical chest pain     Cholesteatoma, left     Health Care Home     H/O suicide attempt     Nightmare     Back pain     Vertigo     Severe vertigo      Current Outpatient Prescriptions   Medication     traZODone (DESYREL) 50 MG tablet     ARIPiprazole (ABILIFY) 10 MG tablet     tenofovir (VIREAD) 300 MG tablet     ibuprofen (ADVIL,MOTRIN) 600 MG tablet     prazosin (MINIPRESS) 1 MG capsule     sertraline (ZOLOFT) 100 MG tablet     meclizine (ANTIVERT) 25 MG tablet     order for DME     No current facility-administered medications for this visit.       ALLERGIES: Influenza vac split [flu virus vaccine]

## 2017-12-12 NOTE — NURSING NOTE
Flu vaccine--pt has allergies to vaccine. Decline for vaccination.  PHQ9--given to pt to fill out for MD to exam

## 2017-12-12 NOTE — PATIENT INSTRUCTIONS
Referral for Physical Therapy faxed to [facility] and pt will be contacted to schedule appointment.  [place]    Referral for (Test): Physical Therapy   Location/Place/Provider: [facility], [facility] [address]   Date/Time: 12/27/17 at 10:00am (Anurag)   Phone: [phone]  [phone]   Additional information/prep.:   Scheduled by: ROWAN Giles

## 2017-12-14 ENCOUNTER — AMBULATORY - HEALTHEAST (OUTPATIENT)
Dept: ADMINISTRATIVE | Facility: REHABILITATION | Age: 32
End: 2017-12-14

## 2017-12-14 DIAGNOSIS — M70.50: ICD-10-CM

## 2017-12-18 ENCOUNTER — TELEPHONE (OUTPATIENT)
Dept: FAMILY MEDICINE | Facility: CLINIC | Age: 32
End: 2017-12-18

## 2017-12-18 NOTE — TELEPHONE ENCOUNTER
I got a message from  about the pt, he had put in an order for the pt to get some labs done, and for the pt to get physical therapy.  I was able to help the pt setup an appointment to come in for labs, on 12/26/17 at 9:00am.  I was able to setup the pt physical therapy appointment for 12/27/17at 10:00am with [facility], in [place].

## 2017-12-27 ENCOUNTER — TELEPHONE (OUTPATIENT)
Dept: FAMILY MEDICINE | Facility: CLINIC | Age: 32
End: 2017-12-27

## 2017-12-27 NOTE — TELEPHONE ENCOUNTER
I called the pt today to inform him that I will be on vacation for three weeks. The pt did answer his phone, I left a  for the pt.

## 2018-01-11 NOTE — PROGRESS NOTES
Preceptor Attestation:  Patient's case reviewed and discussed with  Patient seen and discussed with the resident..  I agree with written assessment and plan of care.  Supervising Physician:  Sharri Giles MD  [facility]

## 2018-02-07 ENCOUNTER — TELEPHONE (OUTPATIENT)
Dept: FAMILY MEDICINE | Facility: CLINIC | Age: 33
End: 2018-02-07

## 2018-02-07 NOTE — TELEPHONE ENCOUNTER
[facility] Follow-up    Call initiated by clinic.  Message recorded by Fabiana Mendez  Calling for: monthly follow up   Patient: See HERBER French  Phone conversation with: Patient  Patient's Phone number/s: Home number on file [phone] (home)  Available today in the PM on their Home number.  OK to leave message on voice mail? Yes      Major diagnoses and/or issues requiring coordination services  Vertigo     In the past month, how many times have you been to the Emergency Room? 0  In the past month, how many times have you been hospitalized? 0    Summary notes: I called to check up on the pt, pt did not answer.  I left a vm for the pt to give me a call back.      Self-management goals:      Counseling and coordination activities with: ROWAN Giles    Follow-up date: monthly

## 2018-03-05 ENCOUNTER — TELEPHONE (OUTPATIENT)
Dept: FAMILY MEDICINE | Facility: CLINIC | Age: 33
End: 2018-03-05

## 2018-03-05 NOTE — TELEPHONE ENCOUNTER
[facility] Follow-up    Call initiated by clinic.  Message recorded by Fabiana Mendez  Calling for: monthly follow up   Patient: See HERBER French  Phone conversation with: Patient  Patient's Phone number/s: Home number on file [phone] (home)  Available today in the PM on their Home number.  OK to leave message on voice mail? Yes      Major diagnoses and/or issues requiring coordination services  Vertigo      In the past month, how many times have you been to the Emergency Room? 0  In the past month, how many times have you been hospitalized? 0    Summary notes: I called to check up on the pt, pt did not answer his phone.  I left a  for the pt to give me a call back .  This is the third not contact of the pt, I will have to dis-enroll the pt from [facility].      Self-management goals:      Counseling and coordination activities with: ROWAN Giles    Follow-up date: monthly

## 2018-04-04 ENCOUNTER — TELEPHONE (OUTPATIENT)
Dept: FAMILY MEDICINE | Facility: CLINIC | Age: 33
End: 2018-04-04

## 2018-04-04 NOTE — TELEPHONE ENCOUNTER
[facility] Follow-up    Call initiated by clinic.  Message recorded by Fabiana Mendez  Calling for: monthly follow up   Patient: See HERBER French  Phone conversation with: Patient  Patient's Phone number/s: Home number on file [phone] (home)  Available today in the AM on their Home number.  OK to leave message on voice mail? Yes      Major diagnoses and/or issues requiring coordination services  vertigo    In the past month, how many times have you been to the Emergency Room? 0  In the past month, how many times have you been hospitalized? 0    Summary notes: I called to check up on the pt, pt stated that he is doing the same.  Pt stated that he has been taking his medication as directed.  I told the pt that I have been trying to get a hold of him.  Pt stated that his children has been playing with his phone, and he is not able to answer it.  I told the pt that he has not been seen in a while, and maybe we should get him in.  Pt stated that his medical is not active right now.  Pt said that when his medical is active, he will schedule an appointment.  Pt still has his dizziness, but takes medication for it.  Pt stated that it seems to help, but not at times.  Pt will give us a call to schedule an appointment when his insurance is active.      Self-management goals:  Get medical insurance    Counseling and coordination activities with: ROWAN Giles    Follow-up date: monthly

## 2019-06-20 ENCOUNTER — OFFICE VISIT (OUTPATIENT)
Dept: FAMILY MEDICINE | Facility: CLINIC | Age: 34
End: 2019-06-20
Payer: COMMERCIAL

## 2019-06-20 ENCOUNTER — AMBULATORY - HEALTHEAST (OUTPATIENT)
Dept: ADMINISTRATIVE | Facility: REHABILITATION | Age: 34
End: 2019-06-20

## 2019-06-20 VITALS
SYSTOLIC BLOOD PRESSURE: 115 MMHG | HEIGHT: 64 IN | OXYGEN SATURATION: 97 % | RESPIRATION RATE: 20 BRPM | TEMPERATURE: 98.2 F | BODY MASS INDEX: 37.49 KG/M2 | WEIGHT: 219.6 LBS | HEART RATE: 76 BPM | DIASTOLIC BLOOD PRESSURE: 81 MMHG

## 2019-06-20 DIAGNOSIS — G56.22 NEURITIS OF LEFT ULNAR NERVE: ICD-10-CM

## 2019-06-20 DIAGNOSIS — H71.92 CHOLESTEATOMA, LEFT: Primary | ICD-10-CM

## 2019-06-20 DIAGNOSIS — B18.1 CHRONIC VIRAL HEPATITIS B WITHOUT DELTA AGENT AND WITHOUT COMA (H): Chronic | ICD-10-CM

## 2019-06-20 ASSESSMENT — MIFFLIN-ST. JEOR: SCORE: 1853.61

## 2019-06-20 NOTE — ASSESSMENT & PLAN NOTE
G56.22) Neuritis of left ulnar nerve  Comment: Mild severity at this point. Discussed posture changes to improve symptoms as well as referred to physical therapy for nerve glides.   Plan: PHYSICAL THERAPY REFERRAL

## 2019-06-20 NOTE — ASSESSMENT & PLAN NOTE
(H71.92) Cholesteatoma, left  (primary encounter diagnosis)  Comment: Surgery in 2015. Lost to follow up. From notes instructed to follow up every 3-4 months for cleaning.   Plan: OTOLARYNGOLOGY REFERRAL

## 2019-06-20 NOTE — PATIENT INSTRUCTIONS
Referral for ( TEST )  :      Otolaryngology  LOCATION/PLACE/Provider :    [facility]  DATE & TIME :     June 21st at 9:30am  PHONE :     [phone]  FAX :     [phone]  Appointment made by clinic staff/:    Ramona    Referral for ( TEST )  :     Gastroenterology  LOCATION/PLACE/Provider :    [facility]  DATE & TIME :     July 8th at 8:10am   PHONE :     [phone]  FAX :     [phone]  Appointment made by clinic staff/:    Ramona    Referral for ( TEST )  :      Physical Therapy  LOCATION/PLACE/Provider :    [facility]  DATE & TIME :     Patient will be called by location   PHONE :     [phone]  FAX :     [phone]  Appointment made by clinic staff/:    Ramona

## 2019-06-20 NOTE — PROGRESS NOTES
HPI:   See HERBER French is a 33 year old  Male who presents for:    Chief Complaint   Patient presents with     Arthritis     elbow joint pain on left, sunday night, pain with flexing and extanding     Medication Reconciliation     needs attention       Handicap parking:  Requesting handicap parking for chronic vertigo symptoms related to cholesteatoma below    Cholesteatoma:  Patient has a Hx of cholesteatoma in his left ear. Surgery for this in 2015. Was suppose to follow up with ENT every 3-4 months for this but has been lost to follow up. Talks about recent drainage coming from the ear as well as a foul odor. States he has almost no hearing in the ear. No pain now.     Left Elbow pain :   Location: medial epicondyle   Duration: 3 days   Radiation: none   Numbness/ Tingling? Yes at times   Weakness? None   Swelling? None   Imaging? None   Treatment tried: ibuprofen          PMHX:     Patient Active Problem List   Diagnosis     Hepatitis B infection     LTBI (latent tuberculosis infection)     Severe episode of recurrent major depressive disorder, with psychotic features (H)     Cholesteatoma, left     Health Care Home     H/O suicide attempt     Severe vertigo       Current Outpatient Medications   Medication Sig Dispense Refill     ibuprofen (ADVIL,MOTRIN) 600 MG tablet Take 1 tablet (600 mg) by mouth every 6 hours as needed 120 tablet 3       Social History     Tobacco Use     Smoking status: Never Smoker     Smokeless tobacco: Never Used   Substance Use Topics     Alcohol use: No     Drug use: No       Social History     Social History Narrative    Lives with girlfriend and his son.         Significant financial stressors.        Allergies   Allergen Reactions     Influenza Vac Split [Flu Virus Vaccine]      Bump on arm         No results found for this or any previous visit (from the past 24 hour(s)).         Review of Systems:    ROS: 10 point ROS neg other than the symptoms noted above in the HPI.          "Physical Exam:     Vitals:    06/20/19 0951   BP: 115/81   Pulse: 76   Resp: 20   Temp: 98.2  F (36.8  C)   TempSrc: Oral   SpO2: 97%   Weight: 99.6 kg (219 lb 9.6 oz)   Height: 1.62 m (5' 3.78\")     Body mass index is 37.95 kg/m .    General: Alert, well-appearing male in NAD  HEENT: PERRL, moist oral mucus membranes, tympanic membrane unable to be visualized on the left due to impacted cerumen, White mass like structure observed  Pulm: CTA BL, no tachypnea  CV: RRR, no murmur  Abd: soft, NTND, no masses  Ext: Warm, well perfused, 2+ BL radial pulses, no LE edema  Skin: No rash on limited skin exam  Psych: Mood appropriate to visit content, full affect, rational thought content and process     ELBOW:   Swelling: None   ROM: Flexion - Full/ extension - Full/ pronation - Full / supination- Full.   Strength: 5/5 w/ elbow flexion/ extension   Bony tenderness: tenderness at medial epicondyle/ lateral epicondyle/ olecranon.   Neuro: Positive ulnar tinel test.   Maneuvers: No pain w/ resisted wrist flexion/ pronation ; No pain w/ resisted wrist extension/ supination/ long finger extension; No pain w/ valgus stress     Assessment and Plan   (H71.92) Cholesteatoma, left  (primary encounter diagnosis)  Comment: Surgery in 2015. Lost to follow up. From notes instructed to follow up every 3-4 months for cleaning.   Plan: OTOLARYNGOLOGY REFERRAL            (B18.1) Chronic viral hepatitis B without delta agent and without coma (H)  Comment: Patient had a viral load of greater than 3 million at one time. Started on viread. UNclear if he continued to this. Last time seen at [facility] from records in 2017.   Plan: GASTROENTEROLOGY ADULT REF CONSULT ONLY      (G56.22) Neuritis of left ulnar nerve  Comment: Mild severity at this point. Discussed posture changes to improve symptoms as well as referred to physical therapy for nerve glides.   Plan: PHYSICAL THERAPY REFERRAL    Follow up: 1 month for ulnar neuritis and to discuss " gynecomastia   Options for treatment and follow-up care were reviewed with the patient and/or guardian. See Y Gillian and/or guardian engaged in the decision making process and verbalized understanding of the options discussed and agreed with the final plan.    Dr. Ariel Jimenez    Precepted with: Dr. Mega Jane

## 2019-06-20 NOTE — ASSESSMENT & PLAN NOTE
(B18.1) Chronic viral hepatitis B without delta agent and without coma (H)  Comment: Patient had a viral load of greater than 3 million at one time. Started on viread. UNclear if he continued to this. Last time seen at [facility] from records in 2017.   Plan: GASTROENTEROLOGY ADULT REF CONSULT ONLY

## 2019-06-21 ENCOUNTER — TRANSFERRED RECORDS (OUTPATIENT)
Dept: HEALTH INFORMATION MANAGEMENT | Facility: CLINIC | Age: 34
End: 2019-06-21

## 2019-06-27 NOTE — PROGRESS NOTES
I have personally reviewed the history and examination as documented by Dr. Jimenez.  I was present during key portions of the visit and agree with the assessment and plan as documented for 33 yr old male with cholesteatoma, vertigo, chronic Hep B here for elbow pain. Elbow consistent w/ ulnar neuritis. Will send for nerve glides. Referral for GI and ENT placed. Precautions given. Anticipatory guidance given.     Mega Jane MD  June 27, 2019  3:58 PM

## 2019-08-01 ENCOUNTER — TRANSFERRED RECORDS (OUTPATIENT)
Dept: HEALTH INFORMATION MANAGEMENT | Facility: CLINIC | Age: 34
End: 2019-08-01

## 2019-08-29 ENCOUNTER — TRANSFERRED RECORDS (OUTPATIENT)
Dept: HEALTH INFORMATION MANAGEMENT | Facility: CLINIC | Age: 34
End: 2019-08-29

## 2020-03-10 ENCOUNTER — HEALTH MAINTENANCE LETTER (OUTPATIENT)
Age: 35
End: 2020-03-10

## 2020-12-20 ENCOUNTER — HEALTH MAINTENANCE LETTER (OUTPATIENT)
Age: 35
End: 2020-12-20

## 2021-04-24 ENCOUNTER — HEALTH MAINTENANCE LETTER (OUTPATIENT)
Age: 36
End: 2021-04-24

## 2021-05-13 ENCOUNTER — HOSPITAL ENCOUNTER (EMERGENCY)
Dept: EMERGENCY MEDICINE | Facility: HOSPITAL | Age: 36
Discharge: HOME | End: 2021-05-13
Attending: STUDENT IN AN ORGANIZED HEALTH CARE EDUCATION/TRAINING PROGRAM

## 2021-05-13 DIAGNOSIS — K61.0 PERIANAL ABSCESS: ICD-10-CM

## 2021-05-13 LAB
CREAT BLD-MCNC: 0.9 MG/DL (ref 0.7–1.3)
GFR SERPL CREATININE-BSD FRML MDRD: >60 ML/MIN/1.73M2

## 2021-05-13 ASSESSMENT — MIFFLIN-ST. JEOR: SCORE: 1916.95

## 2021-05-18 ENCOUNTER — HOSPITAL ENCOUNTER (EMERGENCY)
Dept: EMERGENCY MEDICINE | Facility: HOSPITAL | Age: 36
Discharge: HOME | End: 2021-05-18
Attending: EMERGENCY MEDICINE

## 2021-05-18 DIAGNOSIS — K61.1 PERIRECTAL ABSCESS: ICD-10-CM

## 2021-05-18 LAB
ANION GAP SERPL CALCULATED.3IONS-SCNC: 7 MMOL/L (ref 5–18)
BUN SERPL-MCNC: 11 MG/DL (ref 8–22)
CALCIUM SERPL-MCNC: 8.5 MG/DL (ref 8.5–10.5)
CHLORIDE BLD-SCNC: 105 MMOL/L (ref 98–107)
CO2 SERPL-SCNC: 27 MMOL/L (ref 22–31)
CREAT SERPL-MCNC: 0.86 MG/DL (ref 0.7–1.3)
ERYTHROCYTE [DISTWIDTH] IN BLOOD BY AUTOMATED COUNT: 12.5 % (ref 11–14.5)
GFR SERPL CREATININE-BSD FRML MDRD: >60 ML/MIN/1.73M2
GLUCOSE BLD-MCNC: 123 MG/DL (ref 70–125)
HCT VFR BLD AUTO: 40.9 % (ref 40–54)
HGB BLD-MCNC: 13.4 G/DL (ref 14–18)
MCH RBC QN AUTO: 29.5 PG (ref 27–34)
MCHC RBC AUTO-ENTMCNC: 32.8 G/DL (ref 32–36)
MCV RBC AUTO: 90 FL (ref 80–100)
PLATELET # BLD AUTO: 252 THOU/UL (ref 140–440)
PMV BLD AUTO: 10 FL (ref 8.5–12.5)
POTASSIUM BLD-SCNC: 3.7 MMOL/L (ref 3.5–5)
RBC # BLD AUTO: 4.55 MILL/UL (ref 4.4–6.2)
SODIUM SERPL-SCNC: 139 MMOL/L (ref 136–145)
WBC: 8 THOU/UL (ref 4–11)

## 2021-05-18 ASSESSMENT — MIFFLIN-ST. JEOR: SCORE: 1939.63

## 2021-05-21 LAB
BACTERIA SPEC CULT: ABNORMAL
GRAM STAIN RESULT: ABNORMAL

## 2021-05-27 VITALS — WEIGHT: 240 LBS | BODY MASS INDEX: 40.97 KG/M2 | HEIGHT: 64 IN

## 2021-05-27 VITALS — HEIGHT: 64 IN | WEIGHT: 235 LBS | BODY MASS INDEX: 40.12 KG/M2

## 2021-06-17 NOTE — ED TRIAGE NOTES
Patient arrives with complaint of rectal pain, unable to sit due to pain. Was seen last Thursday for similar issue and had puss drained from rectum. Has noticed some bleeding since.

## 2021-06-17 NOTE — ED PROVIDER NOTES
EMERGENCY DEPARTMENT ENCOUNTER      NAME: Leighton French  AGE: 35 y.o. male  YOB: 1985  MRN: [id number]  EVALUATION DATE & TIME: 2021  3:23 AM    PCP: Florencio Montague    ED PROVIDER: Key Camarillo M.D.    Chief Complaint   Patient presents with     Pilonidal cyst       FINAL IMPRESSION:  1. Perianal abscess        ED COURSE & MEDICAL DECISION MAKIN y.o. old male who presents to the ED for evaluation of rectal pain.   History and physical examination as documented. Vital signs reassuring.     Patient has an area of fluctuance at the posterior margin of his anus.  No history of similar symptoms.  Concern for perirectal/perianal abscess.  Plan for CT to ensure no deep infection.    CT confirms perianal abscess.  I think we can safely drain this in the emergency department.  There is no surrounding cellulitis.  Performed incision and drainage with copious pus return.  See procedure note below.  Given instructions to sit in warm baths to encourage drainage.  I do not think we need antibiotics as there is no concurrent cellulitis and we had good return of pus.  Recommended follow-up with his primary care physician for wound check as well as continued management and CT findings of possible fistula.  Tylenol and ibuprofen as needed for pain control.  Patient was comfortable with the plan.  Discharged in stable condition with return precautions in place.    Scribe time stamps  3:33 AM I saw the patient wearing an eye shield, surgical mask, and gloves.    4:59 AM I performed an incision and drainage procedure on patient.   5:18 AM No complications with procedure. Discussed discharge and follow up. All questions answered. Patient agreeable with plan.     MEDICATIONS GIVEN IN THE EMERGENCY:  Medications   iopamidol solution 100 mL (ISOVUE-370) (100 mL Intravenous Given 21 0432)       NEW PRESCRIPTIONS STARTED AT TODAY'S ER VISIT  Discharge Medication List as of 2021  5:18 AM      CONTINUE  these medications which have NOT CHANGED    Details   ARIPiprazole (ABILIFY) 2 MG tablet Take 2 mg by mouth daily., Until Discontinued, Historical Med      naproxen (NAPROSYN) 500 MG tablet Take 500 mg by mouth 2 (two) times a day with meals., Until Discontinued, Historical Med      sertraline (ZOLOFT) 100 MG tablet Take 200 mg by mouth daily., Until Discontinued, Historical Med              =================================================================    HPI    Patient information was obtained from: Patient    Use of : N/A      Leighton French is a 35 y.o. male with a pertinent history of depression, back pain, latent tuberculosis, and hepatitis B who presents to this ED via walk-in for evaluation of pilonidal cyst.     Patient developed pain near his rectum 2 weeks ago that has since progressively worsened. Denies any injury or trauma. Since onset, pain has significantly worsened. Bowel movements have been normal but worsens pain. Denies similar symptoms in the past. Patient otherwise healthy with no daily medication use.   Patient denies fever, chills, chest pain, shortness of breath, cough, and any other complaints at this time.     REVIEW OF SYSTEMS   Review of Systems   Constitutional: Negative for chills and fever.   Respiratory: Negative for cough and shortness of breath.    Cardiovascular: Negative for chest pain.   Gastrointestinal: Positive for rectal pain. Negative for abdominal pain.   Genitourinary: Negative for difficulty urinating.   Skin: Negative for rash.   Allergic/Immunologic: Negative for immunocompromised state.      PAST MEDICAL HISTORY:  Past Medical History:   Diagnosis Date     Back pain      Depression      Hepatitis B      Latent tuberculosis        PAST SURGICAL HISTORY:  Past Surgical History:   Procedure Laterality Date     INNER EAR SURGERY Left     x 3 surgeries       CURRENT MEDICATIONS:    No current facility-administered medications on file prior to encounter.       Current Outpatient Medications on File Prior to Encounter   Medication Sig     ARIPiprazole (ABILIFY) 2 MG tablet Take 2 mg by mouth daily.     naproxen (NAPROSYN) 500 MG tablet Take 500 mg by mouth 2 (two) times a day with meals.     sertraline (ZOLOFT) 100 MG tablet Take 200 mg by mouth daily.       ALLERGIES:  Allergies   Allergen Reactions     Influenza Virus Vaccines Other (See Comments)     Hive at site that turned into hard bump on upper left arm after flu shot, has been there for over 6 months.            FAMILY HISTORY:  Family History   Problem Relation Age of Onset     No Medical Problems Mother      No Medical Problems Father        SOCIAL HISTORY:   Social History     Socioeconomic History     Marital status: Single     Spouse name: None     Number of children: None     Years of education: None     Highest education level: None   Occupational History     None   Social Needs     Financial resource strain: None     Food insecurity     Worry: None     Inability: None     Transportation needs     Medical: None     Non-medical: None   Tobacco Use     Smoking status: Never Smoker     Smokeless tobacco: Never Used   Substance and Sexual Activity     Alcohol use: No     Drug use: No     Sexual activity: None   Lifestyle     Physical activity     Days per week: None     Minutes per session: None     Stress: None   Relationships     Social connections     Talks on phone: None     Gets together: None     Attends Evangelical service: None     Active member of club or organization: None     Attends meetings of clubs or organizations: None     Relationship status: None     Intimate partner violence     Fear of current or ex partner: None     Emotionally abused: None     Physically abused: None     Forced sexual activity: None   Other Topics Concern     None   Social History Narrative     None       VITALS:  Patient Vitals for the past 24 hrs:   BP Temp Temp src Pulse Resp SpO2 Height Weight   05/13/21 0456 -- -- --  "70 -- 97 % -- --   05/13/21 0455 118/71 -- -- 71 -- 97 % -- --   05/13/21 0319 135/88 97.9  F (36.6  C) Oral 83 14 -- 5' 4\" (1.626 m) (!) 235 lb (106.6 kg)       PHYSICAL EXAM    General: Middle aged and uncomfortable appearing.   HEENT: No gross facial asymmetry. No external evidence of trauma on visual inspection.  Neck: Moving freely.  Chest/Pulm: No respiratory distress. Breathing comfortably on room air.  CV: Regular rate. Extremities are warm and well perfused.  Abdomen: Soft and non-distended without tenderness to palpation.  Rectal: Area of tenderness posterior to the anus with fluctuance.   MSK/Extremities: Moving all 4 extremities appropriately, no deformity.  Skin: No visible lacerations or abrasions.  Neuro: Alert, conversant, appropriate. CN II-XII grossly intact.  Psych: Behavior normal.    LAB:  All pertinent labs reviewed and interpreted.  Results for orders placed or performed during the hospital encounter of 05/13/21   POCT Creatinine   Result Value Ref Range    iSTAT Creatinine 0.9 0.7 - 1.3 mg/dL    iSTAT GFR MDRD Af Amer >60 >60 mL/min/1.73m2    iSTAT GFR MDRD Non Af Amer >60 >60 mL/min/1.73m2       RADIOLOGY:  Reviewed all pertinent imaging. Please see official radiology report.  Ct Pelvis Without Oral With Iv Contrast    Result Date: 5/13/2021  EXAM: CT PELVIS WO ORAL W IV CONTRAST LOCATION: [facility] DATE/TIME: 5/13/2021 4:38 AM INDICATION: Fluctuant around rectum c/w with perirectal abscess, evaluate for deep space infection. COMPARISON: None. TECHNIQUE: CT scan of the pelvis was performed with IV contrast. Multiplanar reformats were obtained. Dose reduction techniques were used. CONTRAST: Iopamidol (Isovue-370) 100mL. FINDINGS: PELVIC ORGANS: At the posterior aspect of the anorectal junction there is a curvilinear fluid collection with peripheral enhancement extending posteriorly down to the level of the superior margin of the midline gluteal cleft and measuring 3 " x 0.9 x 1 cm (series 3, image 112 and series 4, image 79). Findings compatible with a perianal abscess. This may be due to fistulous drainage or formation along the anal sphincter. No other areas of perianal or perirectal abscess. No evidence for abscess in the ischiorectal fossa or mesorectal fat plane. No significant inflammatory change involving the bowel loops within the pelvis. Appendix unremarkable. Bladder unremarkable. MUSCULOSKELETAL: Unremarkable.     1.  At the posterior aspect of the anal-rectal junction there is a curvilinear fluid collection with peripheral enhancement extending posteriorly down to the level of the superior margin of the gluteal cleft. This measures 3 x 0.9 x 1 cm most compatible with a perianal abscess. This may be due to a fistula at the level of the anal sphincter. Consider follow-up nonemergent MRI of the pelvis performed according to the anal fistula protocol. 2.  No evidence for other areas of peripherally enhancing fluid collection or abscess. No inflammatory change of the bowel.          PROCEDURES:   PROCEDURE: Incision and Drainage   INDICATIONS: Localized abscess   PROCEDURE PROVIDER: Dr. Camarillo   SITE: Perirectal area   MEDICATION: Lidocaine 1% plain.  Injecting 4 ccs.   NOTE: The area was prepped with chlorhexidine and draped off in the usual sterile fashion.  Local anesthetic was injected subcutaneously with anesthesia effects demonstrated prior to proceeding.  The area of maximal fluctuance was opened with a #11 blade (sharp point) using two single straight incision to allow for drainage.  The abscess was drained with copious pus return.  The abscess cavity was bluntly explored to separate any loculations. No packing placed.  Wound was left open.   COMPLEXITY: complex requiring probing and breaking up loculations     COMPLICATIONS:  None         Pola HANSON , am serving as a scribe to document services personally performed by Dr. Camarlilo based on my  observation and the provider's statements to me. I, Key Camarillo MD attest that Pola Solo  is acting in a scribe capacity, has observed my performance of the services and has documented them in accordance with my direction.    Key Camarillo M.D.  Emergency Medicine  [facility] EMERGENCY DEPARTMENT  [address]  [facility] [address]  Dept: [phone]  Loc: [phone]     Key Camarillo MD  05/13/21 0534

## 2021-07-03 NOTE — ED PROVIDER NOTES
ED Provider Notes by Lucy Narayan MD at 2021  1:30 PM     Author: Lucy Narayan MD Service: Emergency Medicine Author Type: Physician    Filed: 2021  6:03 PM Date of Service: 2021  1:30 PM Status: Signed    : Lucy Narayan MD (Physician)     Procedure Orders    1. -Incision/Drainage [[id number]] ordered by Lucy Narayan MD             EMERGENCY DEPARTMENT ENCOUNTER      NAME: Leighton French  AGE: 35 y.o. male  YOB: 1985  MRN: [id number]  EVALUATION DATE & TIME: 2021  1:18 PM    PCP: Provider, No Primary Care    ED PROVIDER: Lucy Narayan M.D.        Chief Complaint   Patient presents with   ? Rectal Pain         FINAL IMPRESSION:    1. Perirectal abscess          MEDICAL DECISION MAKIN y.o. male who presents emergency department for recurrent perirectal abscess.  He had this drained in the ER just a few days ago but it has recurred.  Patient underwent repeat incision and drainage with iodoform wick placement.  We will discharge him home with antibiotics and colorectal surgery will call him tomorrow to schedule outpatient follow-up.      ED COURSE:  1:34 PM   I met with the patient, obtained history, performed an initial exam, and discussed options and plan for treatment here in the ED.    4:06 PM  Waiting for pt to go to CT scan.    4:14 PM  Pt is at CT now.  CT tech called and said the patient would like to do the CT scan without contrast as he got nauseated after the last IV contrast.  The tech will speak to the radiologist to see if they feel they can get an adequate image done without contrast.  If so the order can be changed per the tech if not then I feel we should proceed with the contrast and I can always give antiemetics if needed.    5:59 PM  Perirectal abscess I&D was done at the bedside.  Patient tolerated very well.  Moderate amount of discharge and clots expressed.  Wound culture collected and sent.  Quarter  inch iodoform gauze was placed to keep the wound open.  Spoke with colorectal surgery who will call him tomorrow and make an appointment for him to be seen later this week in clinic.  We will start him on antibiotics.  I discussed with him doing sitz bath's and how to care for this wound.  He will remove the wick in 3 days if it is still present.    Patient does not appear toxic or septic.  Patient agrees with the instructions and understands how to care for this.      COVID-19 PPE worn during patient evaluation:  Mask: n95 and homemade masks   Eye Protection: goggles  Gown: none  Hair cover: yes  Face shield: yes   Patient wearing a mask: yes    At the conclusion of the encounter I discussed the results of all of the tests and the disposition. Their questions were answered. The patient (and any family present) acknowledged understanding and were agreeable with the care plan.        CONSULTANTS:  Colorectal surgery          MEDICATIONS GIVEN IN THE EMERGENCY:  Medications   viscous lidocaine HC 2 % solution 10 mL (10 mL Topical Given 5/18/21 1549)   iopamidol solution 100 mL (ISOVUE-370) (100 mL Intravenous Given 5/18/21 1630)   HYDROmorphone injection 1 mg (DILAUDID) (1 mg Intravenous Given 5/18/21 1737)             NEW PRESCRIPTIONS STARTED AT TODAY'S ER VISIT  Current Discharge Medication List      START taking these medications    Details   cefpodoxime (VANTIN) 200 MG tablet Take 2 tablets (400 mg total) by mouth 2 (two) times a day for 7 days.  Qty: 28 tablet, Refills: 0    Associated Diagnoses: Perirectal abscess      metroNIDAZOLE (FLAGYL) 500 MG tablet Take 1 tablet (500 mg total) by mouth 3 (three) times a day for 7 days.  Qty: 21 tablet, Refills: 0    Associated Diagnoses: Perirectal abscess      oxyCODONE (ROXICODONE) 5 MG immediate release tablet 1-2 tabs PO q4hr PRN pain. Do not drive.  Do not mix wih alcohol.  Qty: 12 tablet, Refills: 0    Associated Diagnoses: Perirectal abscess     "        CONDITION:  stable        DISPOSITION:  discharge home with          =================================================================  =================================================================    HPI    Patient information was obtained from: Patient    Use of Intrepreter: N/A      See HERBER French is a 35 y.o. male with history of depression with prior suicide attempt and chronic back pain who presents to the ER with complaints of rectal pain.    Per chart review, patient was seen in this ED on 05/13/21 (5 days ago) with area of fluctuance at the posterior margin of his anus. CT confirmed perianal abscess. Incision and drainage was performed in the ED with copious pus return. Without concurrent cellulitis, no antibiotics were prescribed. Patient was discharged for primary care provider follow up.     Patient reports that after his ED discharge, he initially felt significant improvement. He notes that the same pain has gradually returned since, and is currently experiencing continuous, non-radiating rectal pain rating \"can't bear the pain, unable to do anything\" in triage. No analgesics used, no provoking or palliating factors identified. Patient also notes intermittent rectal bleeding. He is not currently taking any antibiotics. At this time, he denies fever, cough, chest pain, shortness of breath, abdominal pain, vomiting, diarrhea, or any other pertinent complaints.    Of note, patient denies any known history of diabetes.    REVIEW OF SYSTEMS   Review of Systems   Constitutional: Negative for fever.   HENT: Negative for trouble swallowing.    Respiratory: Negative for shortness of breath.    Cardiovascular: Negative for chest pain.   Gastrointestinal: Negative for abdominal pain, diarrhea, nausea and vomiting.        +rectal pain   Genitourinary: Negative for dysuria.   Allergic/Immunologic: Negative for immunocompromised state.   All other systems reviewed and are negative.       PAST " MEDICAL HISTORY:  Past Medical History:   Diagnosis Date   ? Back pain    ? Depression    ? Hepatitis B    ? Latent tuberculosis          PAST SURGICAL HISTORY:  Past Surgical History:   Procedure Laterality Date   ? INNER EAR SURGERY Left     x 3 surgeries         CURRENT MEDICATIONS:    No current facility-administered medications on file prior to encounter.      Current Outpatient Medications on File Prior to Encounter   Medication Sig   ? ARIPiprazole (ABILIFY) 2 MG tablet Take 2 mg by mouth daily.   ? naproxen (NAPROSYN) 500 MG tablet Take 500 mg by mouth 2 (two) times a day with meals.   ? sertraline (ZOLOFT) 100 MG tablet Take 200 mg by mouth daily.         ALLERGIES:  Allergies   Allergen Reactions   ? Influenza Virus Vaccines Other (See Comments)     Hive at site that turned into hard bump on upper left arm after flu shot, has been there for over 6 months.              FAMILY HISTORY:  Family History   Problem Relation Age of Onset   ? No Medical Problems Mother    ? No Medical Problems Father          SOCIAL HISTORY:   Social History     Socioeconomic History   ? Marital status: Single     Spouse name: None   ? Number of children: None   ? Years of education: None   ? Highest education level: None   Occupational History   ? None   Social Needs   ? Financial resource strain: None   ? Food insecurity     Worry: None     Inability: None   ? Transportation needs     Medical: None     Non-medical: None   Tobacco Use   ? Smoking status: Never Smoker   ? Smokeless tobacco: Never Used   Substance and Sexual Activity   ? Alcohol use: No   ? Drug use: No   ? Sexual activity: None   Lifestyle   ? Physical activity     Days per week: None     Minutes per session: None   ? Stress: None   Relationships   ? Social connections     Talks on phone: None     Gets together: None     Attends Pentecostal service: None     Active member of club or organization: None     Attends meetings of clubs or organizations: None      "Relationship status: None   ? Intimate partner violence     Fear of current or ex partner: None     Emotionally abused: None     Physically abused: None     Forced sexual activity: None   Other Topics Concern   ? None   Social History Narrative   ? None         VITALS:  Patient Vitals for the past 24 hrs:   BP Temp Temp src Pulse Resp SpO2 Height Weight   05/18/21 1745 -- -- -- 73 -- 94 % -- --   05/18/21 1700 -- -- -- 77 -- 96 % -- --   05/18/21 1645 -- -- -- 70 -- 96 % -- --   05/18/21 1630 -- -- -- 79 -- 97 % -- --   05/18/21 1600 110/77 -- -- 69 -- 96 % -- --   05/18/21 1547 111/77 -- -- 75 -- 96 % -- --   05/18/21 1224 131/76 97.9  F (36.6  C) Oral 90 18 -- 5' 4\" (1.626 m) (!) 240 lb (108.9 kg)       Wt Readings from Last 3 Encounters:   05/18/21 (!) 240 lb (108.9 kg)   05/13/21 (!) 235 lb (106.6 kg)   05/26/16 220 lb (99.8 kg)         PHYSICAL EXAM    Constitutional:  Well developed, Well nourished, NAD, GCS 15  HENT:  Normocephalic, Atraumatic, Bilateral external ears normal, Nose normal. Neck-  Normal range of motion, No tenderness, Supple, No stridor.   Eyes:  PERRL, EOMI, Conjunctiva normal, No discharge.   Respiratory:  Normal breath sounds, No respiratory distress, No wheezing, Speaks full sentences easily. No cough.   Cardiovascular:  Normal heart rate, Regular rhythm, No murmurs, No rubs, No gallops.   GI:  +obesity.  Bowel sounds normal, Soft, No tenderness, No masses, No flank tenderness. No rebound or guarding. +rectal fluctuance and tenderness (see I&D note below)  : deferred   Musculoskeletal: 2+ DP pulses. No edema. No cyanosis, No clubbing. Good range of motion in all major joints. No major deformities noted.   Integument:  Warm, Dry, No erythema, No rash.  No petechiae.   Neurologic:  Alert & oriented x 3,  No focal deficits noted. Normal gait.   Psychiatric:  Affect normal, Cooperative          LAB:  All pertinent labs reviewed and interpreted.  Recent Results (from the past 24 hour(s)) "   Basic Metabolic Panel    Collection Time: 05/18/21  2:00 PM   Result Value Ref Range    Sodium 139 136 - 145 mmol/L    Potassium 3.7 3.5 - 5.0 mmol/L    Chloride 105 98 - 107 mmol/L    CO2 27 22 - 31 mmol/L    Anion Gap, Calculation 7 5 - 18 mmol/L    Glucose 123 70 - 125 mg/dL    Calcium 8.5 8.5 - 10.5 mg/dL    BUN 11 8 - 22 mg/dL    Creatinine 0.86 0.70 - 1.30 mg/dL    GFR MDRD Af Amer >60 >60 mL/min/1.73m2    GFR MDRD Non Af Amer >60 >60 mL/min/1.73m2   HM2 (CBC W/O DIFF)    Collection Time: 05/18/21  2:13 PM   Result Value Ref Range    WBC 8.0 4.0 - 11.0 thou/uL    RBC 4.55 4.40 - 6.20 mill/uL    Hemoglobin 13.4 (L) 14.0 - 18.0 g/dL    Hematocrit 40.9 40.0 - 54.0 %    MCV 90 80 - 100 fL    MCH 29.5 27.0 - 34.0 pg    MCHC 32.8 32.0 - 36.0 g/dL    RDW 12.5 11.0 - 14.5 %    Platelets 252 140 - 440 thou/uL    MPV 10.0 8.5 - 12.5 fL       No results found for: [facility]        RADIOLOGY:  Reviewed all pertinent imaging. Please see official radiology report.    Ct Pelvis Without Oral With Iv Contrast    Result Date: 5/18/2021  EXAM: CT PELVIS WO ORAL W IV CONTRAST LOCATION: [facility] DATE/TIME: 5/18/2021 4:30 PM INDICATION: recent perianal abscess drainage with increased pain COMPARISON: CT of the pelvis 5/13/2021 TECHNIQUE: CT scan of the pelvis was performed with IV contrast. Multiplanar reformats were obtained. Dose reduction techniques were used. CONTRAST: Iopamidol (Isovue-370) 100mL FINDINGS: PELVIC ORGANS: Soft tissue swelling is present in the perianal tissues, which does not extend above the pelvic floor musculature. A fluid attenuation tract with high attenuation borders is present near the midline gluteal fold, in the same location as the previous fluid collection. The tubular collection measures 4 cm AP (series 6, image 83) and is 9 mm craniocaudal by 8 mm transverse (series 5, image 99). Intrapelvic structures are within normal limits. MUSCULOSKELETAL: Normal.     1.  Recurrent  "oblong fluid collection near the midline gluteal fold extending from the perianal soft tissues consistent with a recurrent perianal abscess. As previously suggested, this may be due to a fistula at the level of the anal sphincter. Consider follow-up nonemergent MRI of the pelvis performed according to the anal fistula protocol.         EKG:    None    Procedures:  -Incision/Drainage    Date/Time: 5/18/2021 5:58 PM  Performed by: Lucy Narayan MD  Authorized by: Lucy Narayan MD       Location     Type:  Abscess (perirectal abscess)    Location:  [facility]    Pre-procedure Details    Skin preparation:  Betadine    Anesthesia (see MAR for exact dosages):     Anesthesia method:  Local infiltration    Local anesthetic:  Lidocaine 1% WITH epi, lidocaine 1% w/o epi and bupivacaine 0.5% w/o epi    Procedure type:     Complexity:  Simple    Procedure details:     Needle aspiration: no      Incision types:  Single straight    Incision depth:  Dermal    Scalpel blade:  11    Wound management:  Probed and deloculated    Drainage:  Purulent and bloody    Drainage amount:  Moderate    Wound treatment:  Wound left open    Packing materials:  1/4 in iodoform gauze    Amount 1/4\" iodoform:  2 inches    Post-procedure    Description of procedure: Wound culture collected and sent to lab.    Patient tolerance: patient tolerated the procedure well with no immediate complications          I, Antonio Up, am serving as a scribe to document services personally performed by Dr. Lucy Narayan based on my observation and the provider's statements to me. I, Dr. Lucy Narayan MD attest that Antonio Up is acting in a scribe capacity, has observed my performance of the services and has documented them in accordance with my direction.        Lucy Narayan M.D. FACE  Emergency Medicine and Medical Toxicology  [facility] " EMERGENCY DEPARTMENT  [address]  [facility] [address]  Dept: [phone]  Loc: [phone]           Lucy Narayan MD  05/18/21 9411

## 2021-10-03 ENCOUNTER — HEALTH MAINTENANCE LETTER (OUTPATIENT)
Age: 36
End: 2021-10-03

## 2022-05-15 ENCOUNTER — HEALTH MAINTENANCE LETTER (OUTPATIENT)
Age: 37
End: 2022-05-15

## 2022-09-10 ENCOUNTER — HEALTH MAINTENANCE LETTER (OUTPATIENT)
Age: 37
End: 2022-09-10

## 2023-01-12 NOTE — PATIENT INSTRUCTIONS
Referral for ( TEST )  :      Otolaryngology  LOCATION/PLACE/Provider :    [facility] [address] [place] [address]  DATE & TIME :     4-25-17 at 9:30am  PHONE :     [phone]  FAX :     [phone]  ADDITIONAL INFORMATION :     NA  Appointment made by clinic staff/:    STEFANO      4/26/17 [facility] consult notes to Dr. Garcia. CXjonathan, JOSÉ MIGUEL (c)  
no...

## 2023-02-26 ENCOUNTER — APPOINTMENT (OUTPATIENT)
Dept: RADIOLOGY | Facility: HOSPITAL | Age: 38
End: 2023-02-26
Attending: STUDENT IN AN ORGANIZED HEALTH CARE EDUCATION/TRAINING PROGRAM
Payer: COMMERCIAL

## 2023-02-26 ENCOUNTER — HOSPITAL ENCOUNTER (EMERGENCY)
Facility: HOSPITAL | Age: 38
Discharge: HOME | End: 2023-02-26
Attending: EMERGENCY MEDICINE | Admitting: EMERGENCY MEDICINE
Payer: COMMERCIAL

## 2023-02-26 VITALS
RESPIRATION RATE: 16 BRPM | HEIGHT: 64 IN | DIASTOLIC BLOOD PRESSURE: 70 MMHG | WEIGHT: 241 LBS | HEART RATE: 77 BPM | SYSTOLIC BLOOD PRESSURE: 133 MMHG | BODY MASS INDEX: 41.15 KG/M2 | OXYGEN SATURATION: 96 % | TEMPERATURE: 97.9 F
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
## 2023-02-26 DIAGNOSIS — S82.892A CLOSED FRACTURE OF LEFT ANKLE, INITIAL ENCOUNTER: ICD-10-CM

## 2023-02-26 PROCEDURE — 73610 X-RAY EXAM OF ANKLE: CPT | Mod: LT

## 2023-02-26 PROCEDURE — 250N000013 HC RX MED GY IP 250 OP 250 PS 637: Performed by: EMERGENCY MEDICINE

## 2023-02-26 PROCEDURE — 99284 EMERGENCY DEPT VISIT MOD MDM: CPT | Mod: 25

## 2023-02-26 RX ORDER — HYDROCODONE BITARTRATE AND ACETAMINOPHEN 5; 325 MG/1; MG/1
1 TABLET ORAL EVERY 4 HOURS PRN
Qty: 18 TABLET | Refills: 0 | Status: SHIPPED | OUTPATIENT
Start: 2023-02-26 | End: 2023-03-01

## 2023-02-26 RX ORDER — IBUPROFEN 200 MG
400 TABLET ORAL ONCE
Status: COMPLETED | OUTPATIENT
Start: 2023-02-26 | End: 2023-02-26

## 2023-02-26 RX ORDER — HYDROCODONE BITARTRATE AND ACETAMINOPHEN 5; 325 MG/1; MG/1
1 TABLET ORAL ONCE
Status: COMPLETED | OUTPATIENT
Start: 2023-02-26 | End: 2023-02-26

## 2023-02-26 RX ADMIN — IBUPROFEN 400 MG: 200 TABLET, FILM COATED ORAL at 16:26

## 2023-02-26 RX ADMIN — HYDROCODONE BITARTRATE AND ACETAMINOPHEN 1 TABLET: 5; 325 TABLET ORAL at 16:26

## 2023-02-26 ASSESSMENT — ACTIVITIES OF DAILY LIVING (ADL): ADLS_ACUITY_SCORE: 37

## 2023-02-26 NOTE — Clinical Note
See Gillian was seen and treated in our emergency department on 2/26/2023.  He may return to work on 03/06/2023.       If you have any questions or concerns, please don't hesitate to call.      Alana Britt MD

## 2023-02-26 NOTE — ED TRIAGE NOTES
The patient was walking down the stairs today and landed on his left foot, left ankle notable swelling; states that it is too painful to walk on. CMS intact

## 2023-02-26 NOTE — ED PROVIDER NOTES
EMERGENCY DEPARTMENT ENCOUNTER      NAME: Leighton French  AGE: 37 year old male  YOB: 1985  MRN: [id number]  EVALUATION DATE & TIME: 2/26/2023  3:13 PM    PCP: Krista Bond    ED PROVIDER: Alana Britt M.D.      Chief Complaint   Patient presents with     Ankle Pain     FINAL IMPRESSION:  1. Closed fracture of left ankle, initial encounter    2. Fall, initial encounter      ED COURSE & MEDICAL DECISION MAKING:    Pertinent Labs & Imaging studies reviewed. (See chart for details)  ED Course as of 02/26/23 1823   Sun Feb 26, 2023   1521 I independently reviewed the patient's ankle x-ray and he appears to have a distal fibula fracture.   1600 Patient is a 37-year-old male comes in today for evaluation of left ankle pain after he fell down some stairs.  It sounds like he fell down with his ankle underneath him.  He is not able to ambulate on the ankle.  He has tenderness medially and laterally.  He just complains of pain all over.  His Achilles tendon is intact but he has no posterior tenderness.  He has diffuse swelling.  He has no tenderness to the proximal lower leg.  X-ray revealed a distal fibula fracture as well as a small avulsion fracture of the medial malleolus.  There is possibly a posterior segment as well.  It is all minimally displaced and I do not think it needs any reduction.  I am going to give him something for pain and will talk to orthopedics to see if I can just put him in a walking boot and make him nonweightbearing with crutches.  They can follow-up with him and determine if he ends up needing surgery.  I discussed the plan with him and he is in agreement and I told my check back in once I talk to Ortho.   6259 I spoke with the orthopedic physician assistant who agreed with a cam walker and crutches and follow-up with them.     3:51 PM I met the patient and performed my initial interview and exam.   4:47 PM We discussed the plan for discharge and the patient is agreeable. Reviewed  supportive cares, symptomatic treatment, outpatient follow up, and reasons to return to the Emergency Department. All questions and concerns were addressed. Patient to be discharged by ED RN.      Medical Decision Making    History:    Supplemental history from: Documented in chart, if applicable    External Record(s) reviewed: Documented in chart, if applicable.    Work Up:    Chart documentation includes differential considered and any EKGs or imaging independently interpreted by provider, where specified.    In additional to work up documented, I considered the following work up: Documented in chart, if applicable.    External consultation:    Discussion of management with another provider: Documented in chart, if applicable Orthopedics    Complicating factors:    Care impacted by chronic illness: N/A    Care affected by social determinants of health: N/A    Disposition considerations: Discharge. I prescribed additional prescription strength medication(s) as charted. N/A.    At the conclusion of the encounter I discussed  the results of all of the tests and the disposition with patient.   All questions were answered.  The patient acknowledged understanding and was involved in the decision making regarding the overall care plan.      I discussed with patient the utility, limitations and findings of the exam/interventions/studies done during this visit as well as the list of differential diagnosis and symptoms to monitor/return to ER for.  Additional verbal discharge instructions were provided.     MEDICATIONS GIVEN IN THE EMERGENCY:  Medications   ibuprofen (ADVIL/MOTRIN) tablet 400 mg (400 mg Oral $Given 2/26/23 1626)   HYDROcodone-acetaminophen (NORCO) 5-325 MG per tablet 1 tablet (1 tablet Oral $Given 2/26/23 1626)       NEW PRESCRIPTIONS STARTED AT TODAY'S ER VISIT  Discharge Medication List as of 2/26/2023  4:52 PM      START taking these medications    Details   HYDROcodone-acetaminophen (NORCO) 5-325 MG  tablet Take 1 tablet by mouth every 4 hours as needed for severe pain (7-10), Disp-18 tablet, R-0, Local Print                =================================================================    HPI    Triage Note: The patient was walking down the stairs today and landed on his left foot, left ankle notable swelling; states that it is too painful to walk on. CMS intact      Patient information was obtained from: patient     Use of : N/A          See HERBER French is a 37 year old male who presents to the ED for evaluation of ankle pain    A couple hours ago, the patient slipping walking down the stairs and landing on his left ankle. He endorses increased left ankle pain and swelling. The patient has been unable to put any weight on the foot since. He denies any other complaints at this time.     PAST MEDICAL HISTORY:  Past Medical History:   Diagnosis Date     Back pain      Depression      H/O tuberculosis      Hepatitis B      Hepatitis B      Latent tuberculosis        PAST SURGICAL HISTORY:  Past Surgical History:   Procedure Laterality Date     INNER EAR SURGERY Left     x 3 surgeries     MIDDLE EAR SURGERY Left     three times, in the 1990s       CURRENT MEDICATIONS:    No current facility-administered medications for this encounter.    Current Outpatient Medications:      HYDROcodone-acetaminophen (NORCO) 5-325 MG tablet, Take 1 tablet by mouth every 4 hours as needed for severe pain (7-10), Disp: 18 tablet, Rfl: 0     ibuprofen (ADVIL,MOTRIN) 600 MG tablet, Take 1 tablet (600 mg) by mouth every 6 hours as needed, Disp: 120 tablet, Rfl: 3    ALLERGIES:  Allergies   Allergen Reactions     Influenza Vac Split [Flu Virus Vaccine]      Bump on arm         FAMILY HISTORY:  Family History   Problem Relation Age of Onset     Cancer Father         liver      Hepatitis Father         father passed away from disease     Diabetes Mother      Tuberculosis Sister      Coronary Artery Disease No family hx of       "Hypertension No family hx of      No Known Problems Mother      No Known Problems Father        SOCIAL HISTORY:   Social History     Socioeconomic History     Marital status: Single     Number of children: 1   Occupational History     Occupation: unemployed   Tobacco Use     Smoking status: Never     Smokeless tobacco: Never   Substance and Sexual Activity     Alcohol use: No     Drug use: No     Sexual activity: Yes     Partners: Female   Other Topics Concern     Caffeine Concern No     Weight Concern Yes     Exercise Yes   Social History Narrative    Lives with girlfriend and his son.         Significant financial stressors.        PHYSICAL EXAM    VITAL SIGNS: /70   Pulse 77   Temp 97.9  F (36.6  C) (Temporal)   Resp 16   Ht 1.626 m (5' 4\")   Wt 109.3 kg (241 lb)   SpO2 96%   BMI 41.37 kg/m     GENERAL: Awake, alert, answering questions appropriately,  SPEECH:  Easy to understand speech, Normal volume and candice  PULMONARY: No respiratory distress  CARDIOVASCULAR: Regular rate and rhythm, Distal pulses present and normal.  EXTREMITIES: Diffuse left ankle swelling. achilles tendon intact. Tenderness to medial and lateral malleolus. No tenderness to proximal lower leg.  No tenderness to the foot or toes.  No obvious deformity  PSYCH: Normal mood and affect     RADIOLOGY:  XR Ankle Left G/E 3 Views   Final Result   IMPRESSION: There is soft tissue swelling over the ankle. There is an acute, nondisplaced, transverse fracture through the distal fibula at the level of the ankle mortise. There is also a tiny acute avulsion fracture off of the medial malleolus. There is    also some irregularity along the posterior malleolus on the lateral view and a nondisplaced posterior malleolus fracture cannot be excluded. Further evaluation with cross-sectional imaging would be helpful.           I, Krista Garcia, am serving as a scribe to document services personally performed by Dr. Britt based on my observation and " the provider's statements to me. I, Alana Britt MD attest that Krista Garcia is acting in a scribe capacity, has observed my performance of the services and has documented them in accordance with my direction.    Alana Britt M.D.  Emergency Medicine  [facility] EMERGENCY DEPARTMENT  [address] [place] [id number]  [phone]  Dept: [phone]     Alana Britt MD  02/26/23 5873

## 2023-02-26 NOTE — DISCHARGE INSTRUCTIONS
You were seen in the Emergency Department today for evaluation of ankle pain after a fall.   Your imaging studies showed a fracture of your left ankle. You were prescribed Norco for pain not relieved by Tylenol or ibuprofen. You should take all medications as prescribed.  Follow up with orthopedics to ensure resolution of symptoms. Return if you have new or worsening symptoms.

## 2023-03-07 ENCOUNTER — TRANSFERRED RECORDS (OUTPATIENT)
Dept: HEALTH INFORMATION MANAGEMENT | Facility: CLINIC | Age: 38
End: 2023-03-07

## 2023-06-03 ENCOUNTER — HEALTH MAINTENANCE LETTER (OUTPATIENT)
Age: 38
End: 2023-06-03

## 2024-07-07 ENCOUNTER — HEALTH MAINTENANCE LETTER (OUTPATIENT)
Age: 39
End: 2024-07-07

## 2025-07-04 ENCOUNTER — HOSPITAL ENCOUNTER (EMERGENCY)
Facility: HOSPITAL | Age: 40
Discharge: HOME | End: 2025-07-04
Admitting: EMERGENCY MEDICINE

## 2025-07-04 VITALS
RESPIRATION RATE: 18 BRPM | DIASTOLIC BLOOD PRESSURE: 81 MMHG | OXYGEN SATURATION: 95 % | WEIGHT: 245 LBS | HEART RATE: 76 BPM | SYSTOLIC BLOOD PRESSURE: 121 MMHG | HEIGHT: 64 IN | BODY MASS INDEX: 41.83 KG/M2 | TEMPERATURE: 97.8 F

## 2025-07-04 DIAGNOSIS — L03.012 PARONYCHIA OF FINGER OF LEFT HAND: ICD-10-CM

## 2025-07-04 DIAGNOSIS — Z23 NEED FOR DIPHTHERIA, TETANUS, PERTUSSIS, AND HIB VACCINATION: ICD-10-CM

## 2025-07-04 DIAGNOSIS — T14.8XXA FOREIGN BODY IN SKIN: ICD-10-CM

## 2025-07-04 PROCEDURE — 99283 EMERGENCY DEPT VISIT LOW MDM: CPT | Mod: 25

## 2025-07-04 PROCEDURE — 250N000013 HC RX MED GY IP 250 OP 250 PS 637: Performed by: EMERGENCY MEDICINE

## 2025-07-04 PROCEDURE — 250N000011 HC RX IP 250 OP 636: Performed by: EMERGENCY MEDICINE

## 2025-07-04 PROCEDURE — 90471 IMMUNIZATION ADMIN: CPT | Performed by: EMERGENCY MEDICINE

## 2025-07-04 PROCEDURE — 90715 TDAP VACCINE 7 YRS/> IM: CPT | Performed by: EMERGENCY MEDICINE

## 2025-07-04 PROCEDURE — 10060 I&D ABSCESS SIMPLE/SINGLE: CPT

## 2025-07-04 RX ORDER — DOXYCYCLINE 100 MG/1
100 CAPSULE ORAL 2 TIMES DAILY
Qty: 10 CAPSULE | Refills: 0 | Status: SHIPPED | OUTPATIENT
Start: 2025-07-04 | End: 2025-07-09

## 2025-07-04 RX ORDER — DOXYCYCLINE 100 MG/1
100 CAPSULE ORAL ONCE
Status: COMPLETED | OUTPATIENT
Start: 2025-07-04 | End: 2025-07-04

## 2025-07-04 RX ADMIN — DOXYCYCLINE 100 MG: 100 CAPSULE ORAL at 15:41

## 2025-07-04 RX ADMIN — CLOSTRIDIUM TETANI TOXOID ANTIGEN (FORMALDEHYDE INACTIVATED), CORYNEBACTERIUM DIPHTHERIAE TOXOID ANTIGEN (FORMALDEHYDE INACTIVATED), BORDETELLA PERTUSSIS TOXOID ANTIGEN (GLUTARALDEHYDE INACTIVATED), BORDETELLA PERTUSSIS FILAMENTOUS HEMAGGLUTININ ANTIGEN (FORMALDEHYDE INACTIVATED), BORDETELLA PERTUSSIS PERTACTIN ANTIGEN, AND BORDETELLA PERTUSSIS FIMBRIAE 2/3 ANTIGEN 0.5 ML: 5; 2; 2.5; 5; 3; 5 INJECTION, SUSPENSION INTRAMUSCULAR at 15:24

## 2025-07-04 ASSESSMENT — COLUMBIA-SUICIDE SEVERITY RATING SCALE - C-SSRS
6. HAVE YOU EVER DONE ANYTHING, STARTED TO DO ANYTHING, OR PREPARED TO DO ANYTHING TO END YOUR LIFE?: NO
1. IN THE PAST MONTH, HAVE YOU WISHED YOU WERE DEAD OR WISHED YOU COULD GO TO SLEEP AND NOT WAKE UP?: NO
2. HAVE YOU ACTUALLY HAD ANY THOUGHTS OF KILLING YOURSELF IN THE PAST MONTH?: NO

## 2025-07-04 ASSESSMENT — ENCOUNTER SYMPTOMS
CHILLS: 0
WOUND: 1
NAUSEA: 0
VOMITING: 0
FEVER: 0

## 2025-07-04 ASSESSMENT — ACTIVITIES OF DAILY LIVING (ADL)
ADLS_ACUITY_SCORE: 41
ADLS_ACUITY_SCORE: 41

## 2025-07-04 NOTE — DISCHARGE INSTRUCTIONS
You were seen here today for evaluation of a foreign body in your skin/swelling in your finger.  This was drained here and will continue to drain at home.    Tonight before bed, fill up a bowl with warm water and a small amount of hand soap-soak your finger for 5 to 10 minutes then rewrap.  Starting tomorrow, soak in warm, soapy water 4-5 times per day and then dressed with a Band-Aid.    Take doxycycline twice daily for 5 days.     You may take Tylenol and ibuprofen for pain/fever, do not exceed 4000 mg of Tylenol per day or 3200 mg ofibuprofen per day.    Follow-up with primary care if needed.  Return to the ER for any new or worsening symptoms including severe pain, redness spreading down your finger, fever, difficulty moving your finger, or any other symptoms that concern you.      If you get to the pharmacy and the medication is going to be very expensive even with good Rx coupon, please call [phone] and we can prescribe an alternate antibiotic.

## 2025-07-04 NOTE — ED TRIAGE NOTES
Pt got a metal splinter from his car in his finger two days ago. Now swollen and red     Triage Assessment (Adult)       Row Name 07/04/25 5675          Triage Assessment    Airway WDL WDL        Respiratory WDL    Respiratory WDL WDL        Skin Circulation/Temperature WDL    Skin Circulation/Temperature WDL WDL        Cardiac WDL    Cardiac WDL WDL        Peripheral/Neurovascular WDL    Peripheral Neurovascular WDL WDL        Cognitive/Neuro/Behavioral WDL    Cognitive/Neuro/Behavioral WDL WDL

## 2025-07-04 NOTE — ED PROVIDER NOTES
EMERGENCY DEPARTMENT ENCOUNTER      NAME: Leighton French  AGE: 39 year old male  YOB: 1985  MRN: [id number]  EVALUATION DATE & TIME: 7/4/2025  2:59 PM    PCP: No primary care provider on file.    ED PROVIDER: Jessica Heard PA-C      Chief Complaint   Patient presents with    Foreign Body in Skin         FINAL IMPRESSION:  1. Paronychia of finger of left hand    2. Foreign body in skin    3. Need for diphtheria, tetanus, pertussis, and Hib vaccination          ED COURSE & MEDICAL DECISION MAKING:    Pertinent Labs & Imaging studies reviewed. (See chart for details)    39 year old male presents to the Emergency Department for evaluation of foreign body in the skin.    Physical exam is remarkable for a generally well-appearing male who is in no acute distress.  He has swelling, erythema, and tenderness to palpation at the nail line on the dorsal aspect of the left index finger.  There is some dark discoloration underneath the skin consistent with possible foreign body.  He has normal range of motion of the finger, no tenderness to palpation on the palmar surface of the finger.  He has good distal sensation, capillary refill is less than 2 seconds.  Vital signs are stable and he is afebrile.    I made a T-shaped incision at the base of the fingernail and extending about 1 mm proximal to the fingernail.  The wound was irrigated copiously and I believe the foreign bodies were removed successfully as I do not see them under the skin any longer.  The wound was cleaned and dressed here, first dose of doxycycline given here.  Tdap was also updated today.  I do not think any emergent labs or imaging are indicated at this time, he has no bony tenderness suggesting fracture or dislocation and denies any symptoms concerning for significant infection.  No evidence of felon. I will prescribe him doxycycline, recommend warm water soaks and follow-up with primary care for recheck.  Advised to return here for any new or  worsening symptoms.  The patient is agreeable with this treatment plan and verbalized understanding.    Medical Decision Making  Care impacted by Lack of medical insurance  I considered additional work up, including xray, but deferred as patient has no bony tenderness  Discharge. I prescribed additional prescription strength medication(s) as charted. N/A.    MIPS (CTPE, Dental pain, Medrano, Sinusitis, Asthma/COPD, Head Trauma): Not Applicable    SEPSIS: None        ED Course   3:00 PM Performed my initial history and physical exam. Discussed workup in the emergency department, management of symptoms, and likely disposition.   3:09 PM Performed nerve block of the finger.   3:39 PM Performed I and D. I discussed the plan for discharge with the patient or family and they are agreeable.. We discussed supportive cares at home and reasons for return to the ER including new or worsening symptoms - all questions and concerns addressed. Patient to be discharged by RN.    At the conclusion of the encounter I discussed the results of all of the tests and the disposition. The questions were answered. The patient or family acknowledged understanding and was agreeable with the care plan.     Voice recognition software was used in the creation of this note. Any grammatical or nonsensical errors are due to inherent errors with the software and are not the intention of the writer.     MEDICATIONS GIVEN IN THE EMERGENCY:  Medications   Tdap (tetanus-diphtheria-acell pertussis) (ADACEL) injection 0.5 mL (0.5 mLs Intramuscular $Given 7/4/25 6645)   doxycycline monohydrate (MONODOX) capsule 100 mg (100 mg Oral $Given 7/4/25 1541)       NEW PRESCRIPTIONS STARTED AT TODAY'S ER VISIT  New Prescriptions    DOXYCYCLINE HYCLATE (VIBRAMYCIN) 100 MG CAPSULE    Take 1 capsule (100 mg) by mouth 2 times daily for 5 days.            =================================================================    HPI    Patient information was obtained from:  Patient    Use of : N/A         See HERBER French is a 39 year old male with PMH of latent TB who presents to the ED via walk-in for evaluation of a foreign body in the hand.    The patient states that he was changing oil 2 days ago and he believes some metal shavings may have become dislodged in his left index finger.  Yesterday, it became swollen and painful at the cuticle line and he is concerned it may be infected.  He has not tried any medications or treatments.  He denies any fevers, chills, nausea, or vomiting.    Last Tdap was 11/19/2014.    REVIEW OF SYSTEMS   Review of Systems   Constitutional:  Negative for chills and fever.   Gastrointestinal:  Negative for nausea and vomiting.   Skin:  Positive for wound.       All other systems reviewed and are negative unless noted in HPI.      PAST MEDICAL HISTORY:  Past Medical History:   Diagnosis Date    Back pain     Depression     H/O tuberculosis     Hepatitis B     Hepatitis B     Latent tuberculosis        PAST SURGICAL HISTORY:  Past Surgical History:   Procedure Laterality Date    INNER EAR SURGERY Left     x 3 surgeries    MIDDLE EAR SURGERY Left     three times, in the 1990s       CURRENT MEDICATIONS:    doxycycline hyclate (VIBRAMYCIN) 100 MG capsule  ibuprofen (ADVIL,MOTRIN) 600 MG tablet        ALLERGIES:  Allergies   Allergen Reactions    Influenza Vac Split [Influenza Virus Vaccine]      Bump on arm         FAMILY HISTORY:  Family History   Problem Relation Age of Onset    Cancer Father         liver     Hepatitis Father         father passed away from disease    Diabetes Mother     Tuberculosis Sister     Coronary Artery Disease No family hx of     Hypertension No family hx of     No Known Problems Mother     No Known Problems Father        SOCIAL HISTORY:   Social History     Socioeconomic History    Marital status: Single    Number of children: 1   Occupational History    Occupation: unemployed   Tobacco Use    Smoking status: Never     "Smokeless tobacco: Never   Substance and Sexual Activity    Alcohol use: No    Drug use: No    Sexual activity: Yes     Partners: Female   Other Topics Concern    Caffeine Concern No    Weight Concern Yes    Exercise Yes   Social History Narrative    Lives with girlfriend and his son.         Significant financial stressors.      Social Drivers of Health      Received from [employer] [facility]    Financial Resource Strain       VITALS:  Patient Vitals for the past 24 hrs:   BP Temp Temp src Pulse Resp SpO2 Height Weight   07/04/25 1457 133/83 97.4  F (36.3  C) Temporal 80 18 96 % 1.626 m (5' 4\") 111.1 kg (245 lb)       PHYSICAL EXAM    VITAL SIGNS: /83   Pulse 80   Temp 97.4  F (36.3  C) (Temporal)   Resp 18   Ht 1.626 m (5' 4\")   Wt 111.1 kg (245 lb)   SpO2 96%   BMI 42.05 kg/m    General Appearance: Alert, cooperative, normal speech and facial symmetry, appears stated age, the patient does not appear in distress  Head:  Normocephalic, without obvious abnormality, atraumatic  Extremities: Swelling, erythema, and tenderness to palpation at the nail line on the dorsal aspect of the left index finger.  There is some dark discoloration underneath the skin consistent with possible foreign body.  He has normal range of motion of the finger, no tenderness to palpation on the palmar surface of the finger.  He has good distal sensation, capillary refill is less than 2 seconds.  Neuro: Patient is awake, alert, and responsive to voice. No gross motor weaknesses or sensory loss; moves all extremities.    LAB:  All pertinent labs reviewed and interpreted.  Labs Ordered and Resulted from Time of ED Arrival to Time of ED Departure - No data to display    RADIOLOGY:  Reviewed all pertinent imaging. Please see official radiology report.  No orders to display       PROCEDURES:   [facility]    PROCEDURE: -Incision/Drainage    Date/Time: 7/4/2025 3:39 PM    Performed by: " Jessica Heard PA-C  Authorized by: Jessica Heard PA-C    Risks, benefits and alternatives discussed.      LOCATION:      Type:  Abscess    Size:  0.5 cm    Location:  Upper extremity    Upper extremity location:  Finger    Finger location:  L index finger    PRE-PROCEDURE DETAILS:     Skin preparation:  Antiseptic wash    PROCEDURE TYPE:     Complexity:  Simple    ANESTHESIA (see MAR for exact dosages):     Anesthesia method:  Nerve block    Block needle gauge:  27 G    Block anesthetic:  Lidocaine 1% w/o epi    Block technique:  Ring block    Block injection procedure:  Anatomic landmarks identified, introduced needle, incremental injection, anatomic landmarks palpated and negative aspiration for blood    Block outcome:  Anesthesia achieved    PROCEDURE DETAILS:     Incision type: T shape.    Scalpel blade:  11    Wound management:  Irrigated with saline    Drainage:  Bloody (3 metallic appearing foreign bodies)    Drainage amount:  Scant    Wound treatment:  Wound left open    Packing materials:  None    PROCEDURE    Patient Tolerance:  Patient tolerated the procedure well with no immediate complications        Jessica Heard PA-C  Emergency Medicine  [facility] EMERGENCY DEPARTMENT  [address] [place] [id number] [phone]  Dept: [phone]       Jessica Heard PA-C  07/04/25 1558

## 2025-07-19 ENCOUNTER — HEALTH MAINTENANCE LETTER (OUTPATIENT)
Age: 40
End: 2025-07-19